# Patient Record
Sex: FEMALE | Race: WHITE | NOT HISPANIC OR LATINO
[De-identification: names, ages, dates, MRNs, and addresses within clinical notes are randomized per-mention and may not be internally consistent; named-entity substitution may affect disease eponyms.]

---

## 2018-05-22 ENCOUNTER — APPOINTMENT (OUTPATIENT)
Dept: FAMILY MEDICINE | Facility: CLINIC | Age: 53
End: 2018-05-22
Payer: COMMERCIAL

## 2018-05-22 ENCOUNTER — NON-APPOINTMENT (OUTPATIENT)
Age: 53
End: 2018-05-22

## 2018-05-22 VITALS
SYSTOLIC BLOOD PRESSURE: 124 MMHG | HEIGHT: 65.5 IN | OXYGEN SATURATION: 99 % | WEIGHT: 128 LBS | DIASTOLIC BLOOD PRESSURE: 82 MMHG | RESPIRATION RATE: 14 BRPM | BODY MASS INDEX: 21.07 KG/M2 | HEART RATE: 82 BPM

## 2018-05-22 VITALS — TEMPERATURE: 98.3 F

## 2018-05-22 DIAGNOSIS — R00.2 PALPITATIONS: ICD-10-CM

## 2018-05-22 DIAGNOSIS — Z87.891 PERSONAL HISTORY OF NICOTINE DEPENDENCE: ICD-10-CM

## 2018-05-22 DIAGNOSIS — Z00.00 ENCOUNTER FOR GENERAL ADULT MEDICAL EXAMINATION W/OUT ABNORMAL FINDINGS: ICD-10-CM

## 2018-05-22 DIAGNOSIS — Z78.9 OTHER SPECIFIED HEALTH STATUS: ICD-10-CM

## 2018-05-22 DIAGNOSIS — J30.9 ALLERGIC RHINITIS, UNSPECIFIED: ICD-10-CM

## 2018-05-22 DIAGNOSIS — Z87.448 PERSONAL HISTORY OF OTHER DISEASES OF URINARY SYSTEM: ICD-10-CM

## 2018-05-22 PROCEDURE — 81003 URINALYSIS AUTO W/O SCOPE: CPT | Mod: QW

## 2018-05-22 PROCEDURE — 99396 PREV VISIT EST AGE 40-64: CPT | Mod: 25

## 2018-05-22 PROCEDURE — 93000 ELECTROCARDIOGRAM COMPLETE: CPT

## 2018-05-22 NOTE — PHYSICAL EXAM
[No Acute Distress] : no acute distress [Well Nourished] : well nourished [Well Developed] : well developed [Well-Appearing] : well-appearing [Normal Sclera/Conjunctiva] : normal sclera/conjunctiva [PERRL] : pupils equal round and reactive to light [EOMI] : extraocular movements intact [Normal Outer Ear/Nose] : the outer ears and nose were normal in appearance [Normal Oropharynx] : the oropharynx was normal [No JVD] : no jugular venous distention [Supple] : supple [No Lymphadenopathy] : no lymphadenopathy [Thyroid Normal, No Nodules] : the thyroid was normal and there were no nodules present [No Respiratory Distress] : no respiratory distress  [Clear to Auscultation] : lungs were clear to auscultation bilaterally [Normal Rate] : normal rate  [Regular Rhythm] : with a regular rhythm [Normal S1, S2] : normal S1 and S2 [No Murmur] : no murmur heard [No Carotid Bruits] : no carotid bruits [No Abdominal Bruit] : a ~M bruit was not heard ~T in the abdomen [No Varicosities] : no varicosities [Pedal Pulses Present] : the pedal pulses are present [No Edema] : there was no peripheral edema [No Extremity Clubbing/Cyanosis] : no extremity clubbing/cyanosis [No Palpable Aorta] : no palpable aorta [Soft] : abdomen soft [Non Tender] : non-tender [Non-distended] : non-distended [No Masses] : no abdominal mass palpated [No HSM] : no HSM [Normal Bowel Sounds] : normal bowel sounds [Normal Posterior Cervical Nodes] : no posterior cervical lymphadenopathy [Normal Anterior Cervical Nodes] : no anterior cervical lymphadenopathy [No CVA Tenderness] : no CVA  tenderness [No Spinal Tenderness] : no spinal tenderness [No Joint Swelling] : no joint swelling [Grossly Normal Strength/Tone] : grossly normal strength/tone [No Rash] : no rash [Normal Gait] : normal gait [Coordination Grossly Intact] : coordination grossly intact [No Focal Deficits] : no focal deficits [Deep Tendon Reflexes (DTR)] : deep tendon reflexes were 2+ and symmetric [Speech Grossly Normal] : speech grossly normal [Memory Grossly Normal] : memory grossly normal [Normal Affect] : the affect was normal [Alert and Oriented x3] : oriented to person, place, and time [Normal Mood] : the mood was normal [Normal Insight/Judgement] : insight and judgment were intact

## 2018-05-22 NOTE — ASSESSMENT
[FreeTextEntry1] : UA/EKG reviewed with pt. Meds were reviewed. Diet/exercise reviewed. Pt needed rx alprazolam 0.5 mg  # 20 for fear o flying and dental work. Lab rx.

## 2018-05-22 NOTE — HEALTH RISK ASSESSMENT
[Very Good] : ~his/her~  mood as very good [No falls in past year] : Patient reported no falls in the past year [0] : 2) Feeling down, depressed, or hopeless: Not at all (0) [Patient reported mammogram was normal] : Patient reported mammogram was normal [Patient reported PAP Smear was normal] : Patient reported PAP Smear was normal [Patient reported bone density results were normal] : Patient reported bone density results were normal [Patient reported colonoscopy was normal] : Patient reported colonoscopy was normal [Reviewed and Current] :  reviewed and current [None] : None [With Significant Other] : lives with significant other [Employed] : employed [College] : College [] :  [# Of Children ___] : has [unfilled] children [Feels Safe at Home] : Feels safe at home [Fully functional (bathing, dressing, toileting, transferring, walking, feeding)] : Fully functional (bathing, dressing, toileting, transferring, walking, feeding) [Fully functional (using the telephone, shopping, preparing meals, housekeeping, doing laundry, using] : Fully functional and needs no help or supervision to perform IADLs (using the telephone, shopping, preparing meals, housekeeping, doing laundry, using transportation, managing medications and managing finances) [Reports changes in vision] : Reports changes in vision [Smoke Detector] : smoke detector [Guns at Home] : guns at home [Safety elements used in home] : safety elements used in home [Seat Belt] :  uses seat belt [Sunscreen] : uses sunscreen [Discussed at today's visit] : Advance Directives Discussed at today's visit [] : No [de-identified] : Gyn, Derm [[id number]] : 0 [Change in mental status noted] : No change in mental status noted [Language] : denies difficulty with language [Behavior] : denies difficulty with behavior [Learning/Retaining New Information] : denies difficulty learning/retaining new information [Handling Complex Tasks] : denies difficulty handling complex tasks [Reasoning] : denies difficulty with reasoning [Spatial Ability and Orientation] : denies difficulty with spatial ability and orientation [Reports changes in hearing] : Reports no changes in hearing [Reports changes in dental health] : Reports no changes in dental health [Carbon Monoxide Detector] : no carbon monoxide detector [MammogramDate] : Sep2017 [[facility] : [id number] [BoneDensityDate] : [id number] [ColonoscopyDate] : Oct2016

## 2018-05-23 LAB
BILIRUB UR QL STRIP: NORMAL
CYTOLOGY CVX/VAG DOC THIN PREP: NORMAL
GLUCOSE UR-MCNC: NORMAL
HCG UR QL: 0.2 EU/DL
HGB UR QL STRIP.AUTO: NORMAL
KETONES UR-MCNC: NORMAL
LEUKOCYTE ESTERASE UR QL STRIP: NORMAL
NITRITE UR QL STRIP: NORMAL
PH UR STRIP: 5.5
PROT UR STRIP-MCNC: NORMAL
SP GR UR STRIP: 1.02

## 2018-05-30 ENCOUNTER — RX RENEWAL (OUTPATIENT)
Age: 53
End: 2018-05-30

## 2018-06-04 ENCOUNTER — RX RENEWAL (OUTPATIENT)
Age: 53
End: 2018-06-04

## 2018-06-04 LAB
CHOLEST SERPL-MCNC: 276
CHOLEST/HDLC SERPL: 2.8
HBA1C MFR BLD HPLC: 4.9
HDLC SERPL-MCNC: 97
LDLC SERPL CALC-MCNC: 163
TRIGL SERPL-MCNC: 67

## 2018-09-11 ENCOUNTER — MEDICATION RENEWAL (OUTPATIENT)
Age: 53
End: 2018-09-11

## 2018-09-11 RX ORDER — RIZATRIPTAN BENZOATE 10 MG/1
10 TABLET, ORALLY DISINTEGRATING ORAL
Qty: 9 | Refills: 6 | Status: DISCONTINUED | COMMUNITY
Start: 2018-09-11 | End: 2018-09-11

## 2019-01-23 ENCOUNTER — RX RENEWAL (OUTPATIENT)
Age: 54
End: 2019-01-23

## 2019-02-15 ENCOUNTER — APPOINTMENT (OUTPATIENT)
Dept: FAMILY MEDICINE | Facility: CLINIC | Age: 54
End: 2019-02-15
Payer: COMMERCIAL

## 2019-02-15 VITALS
DIASTOLIC BLOOD PRESSURE: 84 MMHG | OXYGEN SATURATION: 97 % | BODY MASS INDEX: 21.4 KG/M2 | WEIGHT: 130 LBS | SYSTOLIC BLOOD PRESSURE: 126 MMHG | TEMPERATURE: 99.4 F | HEIGHT: 65.5 IN | RESPIRATION RATE: 14 BRPM | HEART RATE: 113 BPM

## 2019-02-15 LAB
CHOLEST SERPL-MCNC: 248
CHOLEST/HDLC SERPL: 2.7
HDLC SERPL-MCNC: 92
LDLC SERPL CALC-MCNC: 140
TRIGL SERPL-MCNC: 70

## 2019-02-15 PROCEDURE — 99213 OFFICE O/P EST LOW 20 MIN: CPT

## 2019-02-15 NOTE — REVIEW OF SYSTEMS
[Fever] : fever [Chills] : chills [Nasal Discharge] : nasal discharge [Postnasal Drip] : postnasal drip [Cough] : cough [Negative] : Heme/Lymph [FreeTextEntry4] : facial pressure

## 2019-02-15 NOTE — PHYSICAL EXAM
[No Acute Distress] : no acute distress [Well Nourished] : well nourished [Well Developed] : well developed [Well-Appearing] : well-appearing [Normal Outer Ear/Nose] : the outer ears and nose were normal in appearance [Normal TMs] : both tympanic membranes were normal [No JVD] : no jugular venous distention [Supple] : supple [No Lymphadenopathy] : no lymphadenopathy [No Respiratory Distress] : no respiratory distress  [Clear to Auscultation] : lungs were clear to auscultation bilaterally [Normal Rate] : normal rate  [Regular Rhythm] : with a regular rhythm [No Murmur] : no murmur heard [No Carotid Bruits] : no carotid bruits [Normal Posterior Cervical Nodes] : no posterior cervical lymphadenopathy [Normal Anterior Cervical Nodes] : no anterior cervical lymphadenopathy [Speech Grossly Normal] : speech grossly normal [Memory Grossly Normal] : memory grossly normal [Normal Affect] : the affect was normal [Alert and Oriented x3] : oriented to person, place, and time [Normal Mood] : the mood was normal [Normal Insight/Judgement] : insight and judgment were intact [de-identified] : facial pressure,  PND

## 2019-02-15 NOTE — HISTORY OF PRESENT ILLNESS
[FreeTextEntry8] : Pt c/o +earache +cough +losing voice +chills X 2 days  She has a lot of facial pressure, Has fever/chills.

## 2019-02-15 NOTE — ASSESSMENT
[FreeTextEntry1] : She will be using plain mucinex,  nasal spray and rx for cefdinir 300 mg 1 bid pc which she has no issues with all her allergies.

## 2019-02-17 ENCOUNTER — TRANSCRIPTION ENCOUNTER (OUTPATIENT)
Age: 54
End: 2019-02-17

## 2019-03-08 ENCOUNTER — MEDICATION RENEWAL (OUTPATIENT)
Age: 54
End: 2019-03-08

## 2019-03-08 DIAGNOSIS — Z87.42 PERSONAL HISTORY OF OTHER DISEASES OF THE FEMALE GENITAL TRACT: ICD-10-CM

## 2019-03-13 ENCOUNTER — APPOINTMENT (OUTPATIENT)
Dept: FAMILY MEDICINE | Facility: CLINIC | Age: 54
End: 2019-03-13
Payer: COMMERCIAL

## 2019-03-13 VITALS
RESPIRATION RATE: 14 BRPM | OXYGEN SATURATION: 99 % | SYSTOLIC BLOOD PRESSURE: 122 MMHG | HEART RATE: 80 BPM | HEIGHT: 65.5 IN | BODY MASS INDEX: 21.4 KG/M2 | DIASTOLIC BLOOD PRESSURE: 88 MMHG | WEIGHT: 130 LBS

## 2019-03-13 DIAGNOSIS — Z83.79 FAMILY HISTORY OF OTHER DISEASES OF THE DIGESTIVE SYSTEM: ICD-10-CM

## 2019-03-13 DIAGNOSIS — H92.02 OTALGIA, LEFT EAR: ICD-10-CM

## 2019-03-13 PROCEDURE — 96372 THER/PROPH/DIAG INJ SC/IM: CPT

## 2019-03-13 PROCEDURE — 99214 OFFICE O/P EST MOD 30 MIN: CPT | Mod: 25

## 2019-03-13 RX ORDER — IBUPROFEN 600 MG/1
600 TABLET, FILM COATED ORAL
Qty: 30 | Refills: 0 | Status: COMPLETED | COMMUNITY
Start: 2018-10-20 | End: 2019-03-13

## 2019-03-13 RX ORDER — CYANOCOBALAMIN 1000 UG/ML
1000 INJECTION INTRAMUSCULAR; SUBCUTANEOUS
Qty: 0 | Refills: 0 | Status: COMPLETED | OUTPATIENT
Start: 2019-03-13

## 2019-03-13 RX ORDER — FLUCONAZOLE 150 MG/1
150 TABLET ORAL
Qty: 2 | Refills: 2 | Status: COMPLETED | COMMUNITY
Start: 2019-03-08 | End: 2019-03-13

## 2019-03-13 RX ORDER — CEFDINIR 300 MG/1
300 CAPSULE ORAL
Qty: 20 | Refills: 0 | Status: COMPLETED | COMMUNITY
Start: 2019-02-15 | End: 2019-03-13

## 2019-03-13 RX ADMIN — CYANOCOBALAMIN 1 MCG/ML: 1000 INJECTION INTRAMUSCULAR; SUBCUTANEOUS at 00:00

## 2019-03-13 NOTE — PHYSICAL EXAM
[Normal Sclera/Conjunctiva] : normal sclera/conjunctiva [Normal Outer Ear/Nose] : the outer ears and nose were normal in appearance [Supple] : supple [No Lymphadenopathy] : no lymphadenopathy [No Respiratory Distress] : no respiratory distress  [Clear to Auscultation] : lungs were clear to auscultation bilaterally [No Accessory Muscle Use] : no accessory muscle use [Normal Rate] : normal rate  [Regular Rhythm] : with a regular rhythm [Normal S1, S2] : normal S1 and S2 [No Murmur] : no murmur heard [Normal Gait] : normal gait [Coordination Grossly Intact] : coordination grossly intact [No Focal Deficits] : no focal deficits [Normal Affect] : the affect was normal [Alert and Oriented x3] : oriented to person, place, and time [Normal Insight/Judgement] : insight and judgment were intact [de-identified] : bilateral nasal turbinates and posterior oropharynx erythematous, moderate clear post nasal drip, L tm serous effusion noted.

## 2019-03-13 NOTE — HISTORY OF PRESENT ILLNESS
[FreeTextEntry1] : Patient present for med check\par  [de-identified] : Patient presents today fro a follow up for anxiety. Pt states she uses xanax to help when she is flying. Manages her anxiety effectively. Pt also reports feeling fatigued and has lingering cold, she is requesting a b12 shot. Patient reports L ear still feels clogged. Denies fevers, chills, shortness of breath. \par \par

## 2019-03-13 NOTE — REVIEW OF SYSTEMS
[Fatigue] : fatigue [Negative] : Heme/Lymph [FreeTextEntry4] : ear clogged  [de-identified] : anxiety when flying

## 2019-03-13 NOTE — ASSESSMENT
[FreeTextEntry1] : L ear: start flonase, anithistamine daily. \par \par Anxiety with flying: - Patient advised of harmful side effects and use of dependence with long term use of benzodiazepines. Advised caution related to sedative effect and and respiratory depression. Advised not to take while driving or in combination with alcohol. Meditation, mindfulness, exercise, and psychotherapy encouraged. ISTOP checked.\par \par Fatigue - B12 administered today in office 1000mcg IM to L deltoid\par

## 2019-04-08 ENCOUNTER — TRANSCRIPTION ENCOUNTER (OUTPATIENT)
Age: 54
End: 2019-04-08

## 2019-06-20 ENCOUNTER — RX RENEWAL (OUTPATIENT)
Age: 54
End: 2019-06-20

## 2019-08-20 ENCOUNTER — APPOINTMENT (OUTPATIENT)
Dept: FAMILY MEDICINE | Facility: CLINIC | Age: 54
End: 2019-08-20
Payer: COMMERCIAL

## 2019-08-20 ENCOUNTER — NON-APPOINTMENT (OUTPATIENT)
Age: 54
End: 2019-08-20

## 2019-08-20 VITALS
RESPIRATION RATE: 14 BRPM | BODY MASS INDEX: 21.4 KG/M2 | HEART RATE: 85 BPM | OXYGEN SATURATION: 98 % | WEIGHT: 130 LBS | HEIGHT: 65.5 IN | SYSTOLIC BLOOD PRESSURE: 110 MMHG | DIASTOLIC BLOOD PRESSURE: 80 MMHG

## 2019-08-20 LAB
BILIRUB UR QL STRIP: NEGATIVE
GLUCOSE UR-MCNC: NEGATIVE
HCG UR QL: 0.2 EU/DL
HGB UR QL STRIP.AUTO: NEGATIVE
KETONES UR-MCNC: NEGATIVE
LEUKOCYTE ESTERASE UR QL STRIP: NEGATIVE
NITRITE UR QL STRIP: NEGATIVE
PH UR STRIP: 6.5
PROT UR STRIP-MCNC: NEGATIVE
SP GR UR STRIP: 1.01

## 2019-08-20 PROCEDURE — 93000 ELECTROCARDIOGRAM COMPLETE: CPT

## 2019-08-20 PROCEDURE — 81003 URINALYSIS AUTO W/O SCOPE: CPT | Mod: NC,QW

## 2019-08-20 PROCEDURE — 99396 PREV VISIT EST AGE 40-64: CPT | Mod: 25

## 2019-08-20 NOTE — HEALTH RISK ASSESSMENT
[Excellent] : ~his/her~  mood as  excellent [Yes] : Yes [2 - 3 times a week (3 pts)] : 2 - 3  times a week (3 points) [1 or 2 (0 pts)] : 1 or 2 (0 points) [Never (0 pts)] : Never (0 points) [No] : In the past 12 months have you used drugs other than those required for medical reasons? No [One fall no injury in past year] : Patient reported one fall in the past year without injury [0] : 2) Feeling down, depressed, or hopeless: Not at all (0) [None] : None [With Significant Other] : lives with significant other [Employed] : employed [College] : College [Significant Other] : lives with significant other [# Of Children ___] : has [unfilled] children [Feels Safe at Home] : Feels safe at home [Fully functional (bathing, dressing, toileting, transferring, walking, feeding)] : Fully functional (bathing, dressing, toileting, transferring, walking, feeding) [Fully functional (using the telephone, shopping, preparing meals, housekeeping, doing laundry, using] : Fully functional and needs no help or supervision to perform IADLs (using the telephone, shopping, preparing meals, housekeeping, doing laundry, using transportation, managing medications and managing finances) [Reports normal functional visual acuity (ie: able to read med bottle)] : Reports normal functional visual acuity [Smoke Detector] : smoke detector [Safety elements used in home] : safety elements used in home [Sunscreen] : uses sunscreen [Seat Belt] :  uses seat belt [Patient reported PAP Smear was normal] : Patient reported PAP Smear was normal [HIV test declined] : HIV test declined [Hepatitis C test declined] : Hepatitis C test declined [] : No [de-identified] : social  [de-identified] : gym 4 times per week.  [de-identified] : weight watchers [[id number]] : 0 [Change in mental status noted] : No change in mental status noted [Language] : denies difficulty with language [Behavior] : denies difficulty with behavior [Learning/Retaining New Information] : denies difficulty learning/retaining new information [Handling Complex Tasks] : denies difficulty handling complex tasks [Reasoning] : denies difficulty with reasoning [Spatial Ability and Orientation] : denies difficulty with spatial ability and orientation [Reports changes in hearing] : Reports no changes in hearing [Reports changes in vision] : Reports no changes in vision [Carbon Monoxide Detector] : no carbon monoxide detector [Reports changes in dental health] : Reports no changes in dental health [PapSmearDate] : 04/01/19 [de-identified] : no gas in home - everything electric  [FreeTextEntry2] :

## 2019-08-20 NOTE — HISTORY OF PRESENT ILLNESS
[FreeTextEntry1] : patient presents for physical\par  [de-identified] : Patient reports today for a physical. Patient reports feeling well today, no acute complaints. Denies chest pain, shortness of breath, palpitations, headaches, edema, dizziness, bowel or bladder changes.\par

## 2019-08-20 NOTE — ASSESSMENT
[FreeTextEntry1] : Check labs - lab slip given\par Vitals and exam stable \par UA negative\par EKG- NSR\par xanax renewed for travel -  Advised caution related to sedative effect and and respiratory depression. Advised not to take while driving or in combination with alcohol. Meditation, mindfulness, exercise, and psychotherapy encouraged. ISTOP checked.\par

## 2019-08-20 NOTE — PHYSICAL EXAM
[No Acute Distress] : no acute distress [Well Nourished] : well nourished [Well Developed] : well developed [Well-Appearing] : well-appearing [Normal Sclera/Conjunctiva] : normal sclera/conjunctiva [PERRL] : pupils equal round and reactive to light [EOMI] : extraocular movements intact [Normal Outer Ear/Nose] : the outer ears and nose were normal in appearance [Normal Oropharynx] : the oropharynx was normal [Normal Nasal Mucosa] : the nasal mucosa was normal [No JVD] : no jugular venous distention [No Lymphadenopathy] : no lymphadenopathy [Supple] : supple [Thyroid Normal, No Nodules] : the thyroid was normal and there were no nodules present [No Respiratory Distress] : no respiratory distress  [No Accessory Muscle Use] : no accessory muscle use [Clear to Auscultation] : lungs were clear to auscultation bilaterally [Normal Rate] : normal rate  [Regular Rhythm] : with a regular rhythm [Normal S1, S2] : normal S1 and S2 [No Murmur] : no murmur heard [No Carotid Bruits] : no carotid bruits [No Abdominal Bruit] : a ~M bruit was not heard ~T in the abdomen [No Varicosities] : no varicosities [Pedal Pulses Present] : the pedal pulses are present [No Edema] : there was no peripheral edema [No Palpable Aorta] : no palpable aorta [No Extremity Clubbing/Cyanosis] : no extremity clubbing/cyanosis [Soft] : abdomen soft [Non Tender] : non-tender [Non-distended] : non-distended [No Masses] : no abdominal mass palpated [Normal Bowel Sounds] : normal bowel sounds [No HSM] : no HSM [Normal Posterior Cervical Nodes] : no posterior cervical lymphadenopathy [Normal Anterior Cervical Nodes] : no anterior cervical lymphadenopathy [No CVA Tenderness] : no CVA  tenderness [No Spinal Tenderness] : no spinal tenderness [No Joint Swelling] : no joint swelling [Grossly Normal Strength/Tone] : grossly normal strength/tone [No Rash] : no rash [Coordination Grossly Intact] : coordination grossly intact [No Focal Deficits] : no focal deficits [Normal Gait] : normal gait [Normal Affect] : the affect was normal [Normal Insight/Judgement] : insight and judgment were intact [Normal TMs] : both tympanic membranes were normal [Speech Grossly Normal] : speech grossly normal [Memory Grossly Normal] : memory grossly normal [Alert and Oriented x3] : oriented to person, place, and time [Normal Mood] : the mood was normal

## 2019-08-20 NOTE — PAST MEDICAL HISTORY
[Perimenopausal] : perimenopausal [Menarche Age ____] : age at menarche was [unfilled] [Total Preg ___] : G[unfilled] [Full Term ___] : Full Term: [unfilled]

## 2019-10-14 ENCOUNTER — TRANSCRIPTION ENCOUNTER (OUTPATIENT)
Age: 54
End: 2019-10-14

## 2019-10-14 ENCOUNTER — APPOINTMENT (OUTPATIENT)
Dept: FAMILY MEDICINE | Facility: CLINIC | Age: 54
End: 2019-10-14
Payer: COMMERCIAL

## 2019-10-14 PROCEDURE — 90740 HEPB VACC 3 DOSE IMMUNSUP IM: CPT

## 2019-10-14 PROCEDURE — 90471 IMMUNIZATION ADMIN: CPT

## 2019-10-16 LAB
M TB IFN-G BLD-IMP: NEGATIVE
QUANTIFERON TB PLUS MITOGEN MINUS NIL: 3.07 IU/ML
QUANTIFERON TB PLUS NIL: 0.01 IU/ML
QUANTIFERON TB PLUS TB1 MINUS NIL: 0 IU/ML
QUANTIFERON TB PLUS TB2 MINUS NIL: 0 IU/ML

## 2019-12-12 ENCOUNTER — APPOINTMENT (OUTPATIENT)
Dept: FAMILY MEDICINE | Facility: CLINIC | Age: 54
End: 2019-12-12
Payer: COMMERCIAL

## 2019-12-12 VITALS
WEIGHT: 135 LBS | HEART RATE: 88 BPM | BODY MASS INDEX: 22.22 KG/M2 | SYSTOLIC BLOOD PRESSURE: 120 MMHG | DIASTOLIC BLOOD PRESSURE: 70 MMHG | HEIGHT: 65.5 IN | TEMPERATURE: 98.2 F | RESPIRATION RATE: 14 BRPM | OXYGEN SATURATION: 99 %

## 2019-12-12 DIAGNOSIS — R52 PAIN, UNSPECIFIED: ICD-10-CM

## 2019-12-12 DIAGNOSIS — Z87.09 PERSONAL HISTORY OF OTHER DISEASES OF THE RESPIRATORY SYSTEM: ICD-10-CM

## 2019-12-12 LAB
FLUAV SPEC QL CULT: NORMAL
FLUBV AG SPEC QL IA: NORMAL
S PYO AG SPEC QL IA: NEGATIVE

## 2019-12-12 PROCEDURE — 87880 STREP A ASSAY W/OPTIC: CPT | Mod: QW

## 2019-12-12 PROCEDURE — 99213 OFFICE O/P EST LOW 20 MIN: CPT | Mod: 25

## 2019-12-12 PROCEDURE — 87804 INFLUENZA ASSAY W/OPTIC: CPT | Mod: QW

## 2019-12-12 NOTE — PLAN
[FreeTextEntry1] : Acute pharyngitis, body aches and chills but no fevers and afebrile now/ cough, she had flu vaccine as well - most likely viral in nature \par -rapid flu neg\par -Rapid strep neg\par -Throat cx sent to ensure not strep c or false neg given sore throat is main symptom\par -Hot tea w/ honey, warm salt water gargles, increase fluids\par -rapid flu\par refused flu send out due to discomfort with swab\par mucinex bid prn\par \par Postnasal Drip \par -Flonase/azelastine - she has been using flonase, will add azelastine\par -Inc fluids\par -Humidifier use\par \par f/u 3 days if no relief pt agreed w/plan

## 2019-12-12 NOTE — REVIEW OF SYSTEMS
[Earache] : earache [Chills] : chills [Sore Throat] : sore throat [Postnasal Drip] : postnasal drip [Nasal Discharge] : nasal discharge [Cough] : cough [Fever] : no fever [Chest Pain] : no chest pain [Shortness Of Breath] : no shortness of breath [Wheezing] : no wheezing

## 2019-12-12 NOTE — HISTORY OF PRESENT ILLNESS
[___ Days ago] : [unfilled] days ago [FreeTextEntry8] : c/o 2 days of ear pain, sore throat feels like "glass in throat", "feels miserable"\par she said she has been taking motrin \par +sinus pressure, postnasal drip, nasal congestion w/yellow discharge , cough with yellow sputum \par denies sick contacts, recent travel except [facility] with kids \par she had flu vaccine\par +chills, no documented fevers \par +body aches \par denies chest pain or sob or difficulty swallowing\par able to eat and drink  without issue\par she is taking mucinex, claritin, motrin and vit c drinks \par feels tired \par worst symptom she said is her throat \par

## 2019-12-12 NOTE — PHYSICAL EXAM
[No Lymphadenopathy] : no lymphadenopathy [No Edema] : there was no peripheral edema [Normal] : no respiratory distress, lungs were clear to auscultation bilaterally and no accessory muscle use [Normal Posterior Cervical Nodes] : no posterior cervical lymphadenopathy [Normal Anterior Cervical Nodes] : no anterior cervical lymphadenopathy [Normal Affect] : the affect was normal [Normal Mood] : the mood was normal [No Focal Deficits] : no focal deficits [Normal Insight/Judgement] : insight and judgment were intact [de-identified] : left tonsil enlarged she said this is her normal, +erythema of throat but no exudate

## 2019-12-17 LAB — BACTERIA THROAT CULT: NORMAL

## 2020-05-12 ENCOUNTER — APPOINTMENT (OUTPATIENT)
Dept: FAMILY MEDICINE | Facility: CLINIC | Age: 55
End: 2020-05-12
Payer: COMMERCIAL

## 2020-05-12 DIAGNOSIS — U07.1 COVID-19: ICD-10-CM

## 2020-05-12 PROCEDURE — 99213 OFFICE O/P EST LOW 20 MIN: CPT | Mod: 95

## 2020-05-12 NOTE — HISTORY OF PRESENT ILLNESS
[Home] : at home, [unfilled] , at the time of the visit. 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  At present pt feels fine but she did have a " flu like illness in late December and would like to be tested for COVID.  Also needs to know her blood type.

## 2020-05-12 NOTE — ASSESSMENT
[FreeTextEntry1] : Possible exposure to COVID- we reviewed what to do- especially if her boss is positive for COVID.  I ordered the COVID19 AB test for  her.  She also needed an ABO blood typing done .  She will call me with any concerns/ changes

## 2020-05-12 NOTE — REVIEW OF SYSTEMS
[Fever] : no fever [Postnasal Drip] : no postnasal drip [Nasal Discharge] : no nasal discharge [Chills] : no chills [Anxiety] : anxiety [Cough] : no cough [Negative] : Psychiatric

## 2020-05-14 LAB
SARS-COV-2 IGG SERPL IA-ACNC: <0.1 INDEX
SARS-COV-2 IGG SERPL QL IA: NEGATIVE

## 2020-05-18 ENCOUNTER — RX RENEWAL (OUTPATIENT)
Age: 55
End: 2020-05-18

## 2020-06-18 PROBLEM — U07.1 COVID-19: Status: ACTIVE | Noted: 2020-05-12

## 2020-06-22 ENCOUNTER — RX RENEWAL (OUTPATIENT)
Age: 55
End: 2020-06-22

## 2020-08-24 ENCOUNTER — APPOINTMENT (OUTPATIENT)
Dept: FAMILY MEDICINE | Facility: CLINIC | Age: 55
End: 2020-08-24
Payer: COMMERCIAL

## 2020-08-24 ENCOUNTER — NON-APPOINTMENT (OUTPATIENT)
Age: 55
End: 2020-08-24

## 2020-08-24 VITALS
DIASTOLIC BLOOD PRESSURE: 70 MMHG | HEIGHT: 65.5 IN | OXYGEN SATURATION: 99 % | RESPIRATION RATE: 14 BRPM | SYSTOLIC BLOOD PRESSURE: 118 MMHG | BODY MASS INDEX: 22.39 KG/M2 | HEART RATE: 88 BPM | WEIGHT: 136 LBS | TEMPERATURE: 97.5 F

## 2020-08-24 DIAGNOSIS — M35.9 SYSTEMIC INVOLVEMENT OF CONNECTIVE TISSUE, UNSPECIFIED: ICD-10-CM

## 2020-08-24 LAB
BILIRUB UR QL STRIP: NORMAL
GLUCOSE UR-MCNC: NORMAL
HCG UR QL: 0.2 EU/DL
HGB UR QL STRIP.AUTO: NORMAL
KETONES UR-MCNC: NORMAL
LEUKOCYTE ESTERASE UR QL STRIP: NORMAL
NITRITE UR QL STRIP: NORMAL
PH UR STRIP: 5.5
PROT UR STRIP-MCNC: NORMAL
SP GR UR STRIP: 1.02

## 2020-08-24 PROCEDURE — 99396 PREV VISIT EST AGE 40-64: CPT | Mod: 25

## 2020-08-24 PROCEDURE — 36415 COLL VENOUS BLD VENIPUNCTURE: CPT

## 2020-08-24 PROCEDURE — 93000 ELECTROCARDIOGRAM COMPLETE: CPT

## 2020-08-24 PROCEDURE — 90471 IMMUNIZATION ADMIN: CPT

## 2020-08-24 PROCEDURE — 90746 HEPB VACCINE 3 DOSE ADULT IM: CPT

## 2020-08-24 PROCEDURE — 81003 URINALYSIS AUTO W/O SCOPE: CPT | Mod: QW

## 2020-08-24 RX ORDER — AZELASTINE HYDROCHLORIDE 137 UG/1
0.1 SPRAY, METERED NASAL TWICE DAILY
Qty: 1 | Refills: 0 | Status: DISCONTINUED | COMMUNITY
Start: 2019-12-12 | End: 2020-08-24

## 2020-08-24 NOTE — HEALTH RISK ASSESSMENT
[Very Good] : ~his/her~ current health as very good [Excellent] : ~his/her~  mood as  excellent [No falls in past year] : Patient reported no falls in the past year [HIV test declined] : HIV test declined [Hepatitis C test declined] : Hepatitis C test declined [None] : None [With Significant Other] : lives with significant other [Employed] : employed [College] : College [Significant Other] : lives with significant other [Feels Safe at Home] : Feels safe at home [Fully functional (bathing, dressing, toileting, transferring, walking, feeding)] : Fully functional (bathing, dressing, toileting, transferring, walking, feeding) [Fully functional (using the telephone, shopping, preparing meals, housekeeping, doing laundry, using] : Fully functional and needs no help or supervision to perform IADLs (using the telephone, shopping, preparing meals, housekeeping, doing laundry, using transportation, managing medications and managing finances) [Smoke Detector] : smoke detector [Carbon Monoxide Detector] : carbon monoxide detector [Safety elements used in home] : safety elements used in home [Seat Belt] :  uses seat belt [Sunscreen] : uses sunscreen [Patient reported PAP Smear was normal] : Patient reported PAP Smear was normal [de-identified] : 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   [de-identified] : active  [de-identified] : healthy  [Change in mental status noted] : No change in mental status noted [Language] : denies difficulty with language [Behavior] : denies difficulty with behavior [Learning/Retaining New Information] : denies difficulty learning/retaining new information [Handling Complex Tasks] : denies difficulty handling complex tasks [Reasoning] : denies difficulty with reasoning [Spatial Ability and Orientation] : denies difficulty with spatial ability and orientation [Reports changes in hearing] : Reports no changes in hearing [Reports changes in vision] : Reports no changes in vision [Reports changes in dental health] : Reports no changes in dental health [FreeTextEntry2] :  [PapSmearDate] : 06/20

## 2020-08-24 NOTE — ASSESSMENT
[FreeTextEntry1] : Check labs drawn in office today for above assessed conditions. Labs to be resulted at the [facility] core lab.\par Vitals and exam stable \par HM UTD\par UA and EKG stable\par Patient advised of harmful side effects and risk of dependence with long term use of benzodiazepines. Advised caution related to sedative effect and and respiratory depression. Advised not to take while driving or in combination with alcohol. Meditation, mindfulness, exercise, and psychotherapy encouraged. ISTOP checked. Reference #: [id number]. Narcotic contract signed, urine drug screen sent. \par Hep B admin to L deltoid, tolerated well.

## 2020-08-24 NOTE — PHYSICAL EXAM
[No Acute Distress] : no acute distress [Well-Appearing] : well-appearing [Well Nourished] : well nourished [Well Developed] : well developed [Normal Sclera/Conjunctiva] : normal sclera/conjunctiva [EOMI] : extraocular movements intact [PERRL] : pupils equal round and reactive to light [Normal Outer Ear/Nose] : the outer ears and nose were normal in appearance [Normal Oropharynx] : the oropharynx was normal [Normal TMs] : both tympanic membranes were normal [Normal Nasal Mucosa] : the nasal mucosa was normal [No Lymphadenopathy] : no lymphadenopathy [No JVD] : no jugular venous distention [Thyroid Normal, No Nodules] : the thyroid was normal and there were no nodules present [Supple] : supple [No Respiratory Distress] : no respiratory distress  [Clear to Auscultation] : lungs were clear to auscultation bilaterally [No Accessory Muscle Use] : no accessory muscle use [Normal Rate] : normal rate  [Normal S1, S2] : normal S1 and S2 [Regular Rhythm] : with a regular rhythm [No Murmur] : no murmur heard [No Carotid Bruits] : no carotid bruits [No Abdominal Bruit] : a ~M bruit was not heard ~T in the abdomen [No Edema] : there was no peripheral edema [No Varicosities] : no varicosities [Pedal Pulses Present] : the pedal pulses are present [No Extremity Clubbing/Cyanosis] : no extremity clubbing/cyanosis [No Palpable Aorta] : no palpable aorta [Non-distended] : non-distended [Soft] : abdomen soft [Non Tender] : non-tender [Normal Bowel Sounds] : normal bowel sounds [No Masses] : no abdominal mass palpated [No HSM] : no HSM [Normal Posterior Cervical Nodes] : no posterior cervical lymphadenopathy [No CVA Tenderness] : no CVA  tenderness [Normal Anterior Cervical Nodes] : no anterior cervical lymphadenopathy [No Joint Swelling] : no joint swelling [No Spinal Tenderness] : no spinal tenderness [No Rash] : no rash [Grossly Normal Strength/Tone] : grossly normal strength/tone [Coordination Grossly Intact] : coordination grossly intact [Normal Gait] : normal gait [Memory Grossly Normal] : memory grossly normal [No Focal Deficits] : no focal deficits [Speech Grossly Normal] : speech grossly normal [Alert and Oriented x3] : oriented to person, place, and time [Normal Affect] : the affect was normal [Normal Mood] : the mood was normal [Normal Insight/Judgement] : insight and judgment were intact

## 2020-08-24 NOTE — HISTORY OF PRESENT ILLNESS
[FreeTextEntry1] : Patient presents for physical\par  [de-identified] : Patient reports today for a physical. Patient reports feeling well today, no acute complaints. Denies chest pain, shortness of breath, palpitations, headaches, edema, dizziness, bowel or bladder changes. Patient is also requesting renewal of xanax for her anxiety. Deneis side effects or aberrant behaviors or suicidal thoughts/ plans. She has not taken xanax in over 2 weeks.

## 2020-08-25 LAB
25(OH)D3 SERPL-MCNC: 61.6 NG/ML
ALBUMIN SERPL ELPH-MCNC: 4.6 G/DL
ALP BLD-CCNC: 57 U/L
ALT SERPL-CCNC: 17 U/L
ANION GAP SERPL CALC-SCNC: 13 MMOL/L
AST SERPL-CCNC: 25 U/L
BASOPHILS # BLD AUTO: 0.06 K/UL
BASOPHILS NFR BLD AUTO: 1.2 %
BILIRUB SERPL-MCNC: 0.3 MG/DL
BUN SERPL-MCNC: 11 MG/DL
CALCIUM SERPL-MCNC: 9.6 MG/DL
CHLORIDE SERPL-SCNC: 99 MMOL/L
CHOLEST SERPL-MCNC: 259 MG/DL
CHOLEST/HDLC SERPL: 2.3 RATIO
CO2 SERPL-SCNC: 24 MMOL/L
CREAT SERPL-MCNC: 0.71 MG/DL
EOSINOPHIL # BLD AUTO: 0.11 K/UL
EOSINOPHIL NFR BLD AUTO: 2.1 %
GLUCOSE SERPL-MCNC: 87 MG/DL
HCT VFR BLD CALC: 44.7 %
HDLC SERPL-MCNC: 115 MG/DL
HGB BLD-MCNC: 14.4 G/DL
IMM GRANULOCYTES NFR BLD AUTO: 0.4 %
LDLC SERPL CALC-MCNC: 133 MG/DL
LYMPHOCYTES # BLD AUTO: 1.24 K/UL
LYMPHOCYTES NFR BLD AUTO: 23.9 %
MAN DIFF?: NORMAL
MCHC RBC-ENTMCNC: 31.3 PG
MCHC RBC-ENTMCNC: 32.2 GM/DL
MCV RBC AUTO: 97.2 FL
MONOCYTES # BLD AUTO: 0.33 K/UL
MONOCYTES NFR BLD AUTO: 6.4 %
NEUTROPHILS # BLD AUTO: 3.42 K/UL
NEUTROPHILS NFR BLD AUTO: 66 %
PLATELET # BLD AUTO: 254 K/UL
POTASSIUM SERPL-SCNC: 4.3 MMOL/L
PROT SERPL-MCNC: 7.1 G/DL
RBC # BLD: 4.6 M/UL
RBC # FLD: 12.2 %
SODIUM SERPL-SCNC: 136 MMOL/L
T4 SERPL-MCNC: 5.1 UG/DL
TRIGL SERPL-MCNC: 57 MG/DL
TSH SERPL-ACNC: 2.87 UIU/ML
WBC # FLD AUTO: 5.18 K/UL

## 2020-08-27 LAB
AMPHET UR-MCNC: NEGATIVE
BARBITURATES UR-MCNC: NEGATIVE
BENZODIAZ UR-MCNC: NEGATIVE
COCAINE METAB.OTHER UR-MCNC: NEGATIVE
CREATININE, URINE: 153.6 MG/DL
METHADONE UR-MCNC: NEGATIVE
METHAQUALONE UR-MCNC: NEGATIVE
OPIATES UR-MCNC: NEGATIVE
PCP UR-MCNC: NEGATIVE
PROPOXYPH UR QL: NEGATIVE
THC UR QL: NEGATIVE

## 2020-09-18 ENCOUNTER — LABORATORY RESULT (OUTPATIENT)
Age: 55
End: 2020-09-18

## 2020-09-18 ENCOUNTER — APPOINTMENT (OUTPATIENT)
Dept: FAMILY MEDICINE | Facility: CLINIC | Age: 55
End: 2020-09-18
Payer: COMMERCIAL

## 2020-09-18 VITALS
TEMPERATURE: 97.9 F | BODY MASS INDEX: 22.39 KG/M2 | SYSTOLIC BLOOD PRESSURE: 140 MMHG | OXYGEN SATURATION: 98 % | HEART RATE: 79 BPM | DIASTOLIC BLOOD PRESSURE: 80 MMHG | WEIGHT: 136 LBS | RESPIRATION RATE: 14 BRPM | HEIGHT: 65.5 IN
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
## 2020-09-18 DIAGNOSIS — L03.90 CELLULITIS, UNSPECIFIED: ICD-10-CM

## 2020-09-18 DIAGNOSIS — Z91.89 OTHER SPECIFIED PERSONAL RISK FACTORS, NOT ELSEWHERE CLASSIFIED: ICD-10-CM

## 2020-09-18 PROCEDURE — 36415 COLL VENOUS BLD VENIPUNCTURE: CPT

## 2020-09-18 PROCEDURE — 99213 OFFICE O/P EST LOW 20 MIN: CPT | Mod: 25

## 2020-09-18 NOTE — PHYSICAL EXAM
[No Lymphadenopathy] : no lymphadenopathy [Supple] : supple [Normal] : normal rate, regular rhythm, normal S1 and S2 and no murmur heard [No Edema] : there was no peripheral edema [No Focal Deficits] : no focal deficits [Normal Affect] : the affect was normal [Normal Mood] : the mood was normal [Normal Insight/Judgement] : insight and judgment were intact [de-identified] : erythematous area surrounding insertion site of bug bite, with scab, removed to ensure not a tick with patients permission with underneath scab having a small greenish area no discharge was ableto be expressed , another lesion in left groin with mild surrounding erythema and scab

## 2020-09-18 NOTE — HISTORY OF PRESENT ILLNESS
[FreeTextEntry8] : pt c/o insect bite on left side of the back, +irritated, -+itchy, +red, - pus X 2 days  \par she isnt sure what kind of bug it was\[place] \par she had surronding erythema that was worse yesterday\par \par denies fevers, chills, joint pain

## 2020-09-18 NOTE — PLAN
[FreeTextEntry1] : \par \par bug bite will treat as cellulitis due to surrounding erythema \par tick panel\par doxycyline 200mg x 1 dose for lymes prophylaxis in case of tick bite\par then doxycyline 100mg po bid x 7 days if clear by 6 days can stop after 6 days \par mupirocin tid x 10 days\par \par hold off on flu vaccine for now given current  infection\par \par repeat tick panel 6 weeks\par if fever or chills needs to be seen right away if we arent open advised to go to urgent care \par \par labs drawn in office and will be sent to [facility] core lab\par \par f/u 3 days if no relief, pt agreed w/plan\par \par

## 2020-09-18 NOTE — REVIEW OF SYSTEMS
[Itching] : Itching [Skin Rash] : skin rash [Fever] : no fever [Chills] : no chills [Chest Pain] : no chest pain [Shortness Of Breath] : no shortness of breath

## 2020-09-20 ENCOUNTER — TRANSCRIPTION ENCOUNTER (OUTPATIENT)
Age: 55
End: 2020-09-20

## 2020-09-20 LAB
ALBUMIN SERPL ELPH-MCNC: 4.7 G/DL
ALP BLD-CCNC: 53 U/L
ALT SERPL-CCNC: 15 U/L
ANION GAP SERPL CALC-SCNC: 12 MMOL/L
AST SERPL-CCNC: 22 U/L
B BURGDOR AB SER-IMP: NEGATIVE
B BURGDOR IGG+IGM SER QL: 0.24 INDEX
BASOPHILS # BLD AUTO: 0.06 K/UL
BASOPHILS NFR BLD AUTO: 0.9 %
BILIRUB SERPL-MCNC: 0.4 MG/DL
BUN SERPL-MCNC: 11 MG/DL
CALCIUM SERPL-MCNC: 10 MG/DL
CHLORIDE SERPL-SCNC: 100 MMOL/L
CO2 SERPL-SCNC: 28 MMOL/L
CREAT SERPL-MCNC: 0.69 MG/DL
EOSINOPHIL # BLD AUTO: 0.15 K/UL
EOSINOPHIL NFR BLD AUTO: 2.2 %
GLUCOSE SERPL-MCNC: 90 MG/DL
HCT VFR BLD CALC: 42.6 %
HGB BLD-MCNC: 13.7 G/DL
IMM GRANULOCYTES NFR BLD AUTO: 0.1 %
LYMPHOCYTES # BLD AUTO: 1.6 K/UL
LYMPHOCYTES NFR BLD AUTO: 23.4 %
MAN DIFF?: NORMAL
MCHC RBC-ENTMCNC: 30.9 PG
MCHC RBC-ENTMCNC: 32.2 GM/DL
MCV RBC AUTO: 95.9 FL
MONOCYTES # BLD AUTO: 0.47 K/UL
MONOCYTES NFR BLD AUTO: 6.9 %
NEUTROPHILS # BLD AUTO: 4.55 K/UL
NEUTROPHILS NFR BLD AUTO: 66.5 %
PLATELET # BLD AUTO: 302 K/UL
POTASSIUM SERPL-SCNC: 4.8 MMOL/L
PROT SERPL-MCNC: 6.8 G/DL
RBC # BLD: 4.44 M/UL
RBC # FLD: 11.9 %
SODIUM SERPL-SCNC: 140 MMOL/L
WBC # FLD AUTO: 6.84 K/UL

## 2020-09-21 ENCOUNTER — NON-APPOINTMENT (OUTPATIENT)
Age: 55
End: 2020-09-21

## 2020-09-23 LAB
A PHAGO GROEL BLD QL NAA+NON-PROBE: NEGATIVE
ANAPLASMA PHAGOCYTO IGM COMENT: NORMAL
ANAPLASMA PHAGOCYTO IGM COMMENT: NORMAL
ANAPLASMA PHAGOCYTOPHILIA IGG ANTIBODIES: NORMAL
ANAPLASMA PHAGOCYTOPHILIA IGM ANTIBODIES: NORMAL
B BURGDOR AB SER-IMP: NEGATIVE
B BURGDOR IGM PATRN SER IB-IMP: NEGATIVE
B BURGDOR18KD IGG SER QL IB: NORMAL
B BURGDOR23KD IGG SER QL IB: NORMAL
B BURGDOR23KD IGM SER QL IB: NORMAL
B BURGDOR28KD IGG SER QL IB: NORMAL
B BURGDOR30KD IGG SER QL IB: NORMAL
B BURGDOR31KD IGG SER QL IB: NORMAL
B BURGDOR39KD IGG SER QL IB: NORMAL
B BURGDOR39KD IGM SER QL IB: NORMAL
B BURGDOR41KD IGG SER QL IB: PRESENT
B BURGDOR41KD IGM SER QL IB: NORMAL
B BURGDOR45KD IGG SER QL IB: NORMAL
B BURGDOR58KD IGG SER QL IB: NORMAL
B BURGDOR66KD IGG SER QL IB: NORMAL
B BURGDOR93KD IGG SER QL IB: NORMAL
B DIV+MO-1 18S RRNA BLD QL NAA+NON-PROBE: NEGATIVE
B DUNCANI 18S RRNA BLD QL NAA+NON-PROBE: NEGATIVE
B MICROTI 18S RRNA BLD QL NAA+NON-PROBE: NEGATIVE
B MICROTI AB SER QL: NORMAL
BABESIA ANTIBODIES, IGG: NORMAL
BABESIA ANTIBODIES, IGM: NORMAL
E CANIS+EWIN GROEL BLD QL NAA+NON-PROBE: NEGATIVE
E CHAFF GROEL BLD QL NAA+NON-PROBE: NEGATIVE
E MURIS EAUCL GROEL BLD QL NAA+NON-PRB: NEGATIVE
EHRLICIA CHAFFEENIS IGG ANTIBODIES: NORMAL
EHRLICIA CHAFFEENIS IGG COMMENT: NORMAL
EHRLICIA CHAFFEENIS IGG INTERP: NORMAL
EHRLICIA CHAFFEENIS IGM ANTIBODIES: NORMAL
F. TULARENSIS AB, IGG: NEGATIVE
F. TULARENSIS AB, IGM: NEGATIVE
F. TULARENSIS INTERPRETATION: NORMAL
GALACTOSE-ALPHA-1,3-GALACTOSE IGE: 0.15 KU/L
R RICKETTSI IGG CSF-ACNC: NEGATIVE
R RICKETTSI IGM CSF-ACNC: 1.25 INDEX

## 2020-09-25 ENCOUNTER — TRANSCRIPTION ENCOUNTER (OUTPATIENT)
Age: 55
End: 2020-09-25

## 2020-10-05 ENCOUNTER — TRANSCRIPTION ENCOUNTER (OUTPATIENT)
Age: 55
End: 2020-10-05

## 2020-11-21 ENCOUNTER — APPOINTMENT (OUTPATIENT)
Dept: FAMILY MEDICINE | Facility: CLINIC | Age: 55
End: 2020-11-21

## 2020-12-21 PROBLEM — Z87.42 HISTORY OF VAGINITIS: Status: RESOLVED | Noted: 2019-03-08 | Resolved: 2020-12-21

## 2020-12-21 PROBLEM — Z87.09 HISTORY OF SORE THROAT: Status: RESOLVED | Noted: 2019-12-12 | Resolved: 2020-12-21

## 2021-01-13 ENCOUNTER — APPOINTMENT (OUTPATIENT)
Dept: FAMILY MEDICINE | Facility: CLINIC | Age: 56
End: 2021-01-13
Payer: COMMERCIAL

## 2021-01-13 DIAGNOSIS — J32.9 CHRONIC SINUSITIS, UNSPECIFIED: ICD-10-CM

## 2021-01-13 PROCEDURE — 99441: CPT

## 2021-03-05 ENCOUNTER — APPOINTMENT (OUTPATIENT)
Dept: FAMILY MEDICINE | Facility: CLINIC | Age: 56
End: 2021-03-05
Payer: COMMERCIAL

## 2021-03-05 DIAGNOSIS — R09.81 NASAL CONGESTION: ICD-10-CM

## 2021-03-05 DIAGNOSIS — J01.90 ACUTE SINUSITIS, UNSPECIFIED: ICD-10-CM

## 2021-03-05 PROCEDURE — 99442: CPT

## 2021-04-25 ENCOUNTER — RX RENEWAL (OUTPATIENT)
Age: 56
End: 2021-04-25

## 2021-05-15 ENCOUNTER — APPOINTMENT (OUTPATIENT)
Dept: FAMILY MEDICINE | Facility: CLINIC | Age: 56
End: 2021-05-15

## 2021-07-18 ENCOUNTER — RX RENEWAL (OUTPATIENT)
Age: 56
End: 2021-07-18

## 2021-08-25 ENCOUNTER — APPOINTMENT (OUTPATIENT)
Dept: FAMILY MEDICINE | Facility: CLINIC | Age: 56
End: 2021-08-25
Payer: COMMERCIAL

## 2021-08-25 VITALS
OXYGEN SATURATION: 98 % | HEART RATE: 90 BPM | SYSTOLIC BLOOD PRESSURE: 138 MMHG | TEMPERATURE: 98 F | WEIGHT: 130 LBS | HEIGHT: 65.5 IN | BODY MASS INDEX: 21.4 KG/M2 | DIASTOLIC BLOOD PRESSURE: 94 MMHG | RESPIRATION RATE: 15 BRPM

## 2021-08-25 DIAGNOSIS — R59.1 GENERALIZED ENLARGED LYMPH NODES: ICD-10-CM

## 2021-08-25 DIAGNOSIS — R22.1 LOCALIZED SWELLING, MASS AND LUMP, NECK: ICD-10-CM

## 2021-08-25 LAB — CYTOLOGY CVX/VAG DOC THIN PREP: NORMAL

## 2021-08-25 PROCEDURE — 36415 COLL VENOUS BLD VENIPUNCTURE: CPT

## 2021-08-25 PROCEDURE — 99396 PREV VISIT EST AGE 40-64: CPT | Mod: 25

## 2021-08-25 PROCEDURE — G0444 DEPRESSION SCREEN ANNUAL: CPT | Mod: 59

## 2021-08-25 RX ORDER — DOXYCYCLINE 100 MG/1
100 CAPSULE ORAL TWICE DAILY
Qty: 14 | Refills: 0 | Status: DISCONTINUED | COMMUNITY
Start: 2020-09-18 | End: 2021-08-25

## 2021-08-25 RX ORDER — DOXYCYCLINE 100 MG/1
100 TABLET, FILM COATED ORAL
Qty: 14 | Refills: 0 | Status: DISCONTINUED | COMMUNITY
Start: 2020-09-18 | End: 2021-08-25

## 2021-08-25 RX ORDER — DOXYCYCLINE HYCLATE 100 MG/1
100 TABLET ORAL
Qty: 20 | Refills: 0 | Status: DISCONTINUED | COMMUNITY
Start: 2021-03-05 | End: 2021-08-25

## 2021-08-25 RX ORDER — IPRATROPIUM BROMIDE 42 UG/1
0.06 SPRAY NASAL
Qty: 1 | Refills: 3 | Status: DISCONTINUED | COMMUNITY
Start: 2021-03-05 | End: 2021-08-25

## 2021-08-25 RX ORDER — MUPIROCIN 20 MG/G
2 OINTMENT TOPICAL 3 TIMES DAILY
Qty: 1 | Refills: 0 | Status: DISCONTINUED | COMMUNITY
Start: 2020-09-18 | End: 2021-08-25

## 2021-08-25 NOTE — PLAN
[FreeTextEntry1] : \par cpe\par -Labs\par labs drawn in office and will be sent to [facility] core lab\par -Offered HIV/ Hep C Screening agreed \par -Advised annual ophthalmology, dental and dermatology visits- seen \par -Annual depression screening - negative\par \par possible right posterior lymphadenopathy, thyroid feels enlarged\par us neck\par tsh\par cbc\par \par fear of flying\par refilled xanax prn\par Reference #: [id number]\par \par she will have mammo and colonoscopy report sent\par \par \par f/u for results pt agreed w/plan\par

## 2021-08-25 NOTE — REVIEW OF SYSTEMS
[Anxiety] : anxiety [Negative] : Heme/Lymph [Fever] : no fever [Chills] : no chills [Night Sweats] : no night sweats [Recent Change In Weight] : ~T no recent weight change [Chest Pain] : no chest pain [Palpitations] : no palpitations [Dysuria] : no dysuria [Hematuria] : no hematuria [Itching] : no itching [Mole Changes] : no mole changes [Skin Rash] : no skin rash [Headache] : no headache [Dizziness] : no dizziness [Fainting] : no fainting [Depression] : no depression

## 2021-08-25 NOTE — HISTORY OF PRESENT ILLNESS
[FreeTextEntry1] : Patient presents for a physical exam  [de-identified] : 56yo F presents for cpe\par she is feeling well but having more anxiety lately\par she said paxil works well but she is having breakthrough anxiety\par she said she needs a xanax renewal for fear of flying\par

## 2021-08-25 NOTE — HEALTH RISK ASSESSMENT
[0] : 2) Feeling down, depressed, or hopeless: Not at all (0) [PHQ-2 Negative - No further assessment needed] : PHQ-2 Negative - No further assessment needed [Patient reported PAP Smear was normal] : Patient reported PAP Smear was normal [Patient reported colonoscopy was normal] : Patient reported colonoscopy was normal [HIV Test offered] : HIV Test offered [Hepatitis C test offered] : Hepatitis C test offered [[id number]] : 0 [MammogramComments] : schedule for next week  [PapSmearDate] : 6/2021 [ColonoscopyDate] : 2016 [ColonoscopyComments] : due in october

## 2021-08-25 NOTE — PHYSICAL EXAM
[Supple] : supple [No Edema] : there was no peripheral edema [Declined Breast Exam] : declined breast exam  [Normal] : soft, non-tender, non-distended, no masses palpated, no HSM and normal bowel sounds [No CVA Tenderness] : no CVA  tenderness [Grossly Normal Strength/Tone] : grossly normal strength/tone [No Rash] : no rash [Coordination Grossly Intact] : coordination grossly intact [Normal Affect] : the affect was normal [Normal Mood] : the mood was normal [Normal Insight/Judgement] : insight and judgment were intact [de-identified] : thyroid feels enlarged, possible right posterior lymphadenopathy  [de-identified] : no calf tenderness b/l LE [de-identified] : CN 2-12 INTACT, NORMAL STRENGTH UPPER AND LOWER EXT B/L 5/5, NORMAL RAPID ALTERNATING MOVEMENTS AND FINGER TO NOSE

## 2021-08-26 ENCOUNTER — APPOINTMENT (OUTPATIENT)
Dept: ULTRASOUND IMAGING | Facility: CLINIC | Age: 56
End: 2021-08-26
Payer: COMMERCIAL

## 2021-08-26 ENCOUNTER — OUTPATIENT (OUTPATIENT)
Dept: OUTPATIENT SERVICES | Facility: HOSPITAL | Age: 56
LOS: 1 days | End: 2021-08-26
Payer: COMMERCIAL

## 2021-08-26 DIAGNOSIS — R59.1 GENERALIZED ENLARGED LYMPH NODES: ICD-10-CM

## 2021-08-26 DIAGNOSIS — Z00.00 ENCOUNTER FOR GENERAL ADULT MEDICAL EXAMINATION WITHOUT ABNORMAL FINDINGS: ICD-10-CM

## 2021-08-26 DIAGNOSIS — R22.1 LOCALIZED SWELLING, MASS AND LUMP, NECK: ICD-10-CM

## 2021-08-26 PROCEDURE — 76536 US EXAM OF HEAD AND NECK: CPT | Mod: 26

## 2021-08-26 PROCEDURE — 76536 US EXAM OF HEAD AND NECK: CPT

## 2021-08-27 DIAGNOSIS — R79.89 OTHER SPECIFIED ABNORMAL FINDINGS OF BLOOD CHEMISTRY: ICD-10-CM

## 2021-08-27 DIAGNOSIS — E87.5 HYPERKALEMIA: ICD-10-CM

## 2021-08-27 LAB
ALBUMIN SERPL ELPH-MCNC: 5 G/DL
ALP BLD-CCNC: 74 U/L
ALT SERPL-CCNC: 18 U/L
ANION GAP SERPL CALC-SCNC: 12 MMOL/L
AST SERPL-CCNC: 23 U/L
BASOPHILS # BLD AUTO: 0.06 K/UL
BASOPHILS NFR BLD AUTO: 0.9 %
BILIRUB SERPL-MCNC: 0.4 MG/DL
BUN SERPL-MCNC: 13 MG/DL
CALCIUM SERPL-MCNC: 10.1 MG/DL
CHLORIDE SERPL-SCNC: 101 MMOL/L
CHOLEST SERPL-MCNC: 294 MG/DL
CO2 SERPL-SCNC: 26 MMOL/L
CREAT SERPL-MCNC: 0.75 MG/DL
EOSINOPHIL # BLD AUTO: 0.12 K/UL
EOSINOPHIL NFR BLD AUTO: 1.9 %
ESTIMATED AVERAGE GLUCOSE: 97 MG/DL
GLUCOSE SERPL-MCNC: 87 MG/DL
HBA1C MFR BLD HPLC: 5 %
HCT VFR BLD CALC: 45.5 %
HCV AB SER QL: NONREACTIVE
HCV S/CO RATIO: 0.17 S/CO
HDLC SERPL-MCNC: 118 MG/DL
HGB BLD-MCNC: 14.5 G/DL
HIV1+2 AB SPEC QL IA.RAPID: NONREACTIVE
IMM GRANULOCYTES NFR BLD AUTO: 0.3 %
LDLC SERPL CALC-MCNC: 166 MG/DL
LYMPHOCYTES # BLD AUTO: 1.35 K/UL
LYMPHOCYTES NFR BLD AUTO: 21.2 %
MAN DIFF?: NORMAL
MCHC RBC-ENTMCNC: 31.7 PG
MCHC RBC-ENTMCNC: 31.9 GM/DL
MCV RBC AUTO: 99.6 FL
MONOCYTES # BLD AUTO: 0.45 K/UL
MONOCYTES NFR BLD AUTO: 7.1 %
NEUTROPHILS # BLD AUTO: 4.38 K/UL
NEUTROPHILS NFR BLD AUTO: 68.6 %
NONHDLC SERPL-MCNC: 177 MG/DL
PLATELET # BLD AUTO: 259 K/UL
POTASSIUM SERPL-SCNC: 5.4 MMOL/L
PROT SERPL-MCNC: 7 G/DL
RBC # BLD: 4.57 M/UL
RBC # FLD: 11.9 %
SODIUM SERPL-SCNC: 138 MMOL/L
TRIGL SERPL-MCNC: 52 MG/DL
TSH SERPL-ACNC: 2 UIU/ML
WBC # FLD AUTO: 6.38 K/UL

## 2021-08-31 ENCOUNTER — NON-APPOINTMENT (OUTPATIENT)
Age: 56
End: 2021-08-31

## 2021-10-08 ENCOUNTER — APPOINTMENT (OUTPATIENT)
Dept: ENDOCRINOLOGY | Facility: CLINIC | Age: 56
End: 2021-10-08
Payer: COMMERCIAL

## 2021-10-08 VITALS
WEIGHT: 130 LBS | HEART RATE: 87 BPM | DIASTOLIC BLOOD PRESSURE: 88 MMHG | SYSTOLIC BLOOD PRESSURE: 150 MMHG | HEIGHT: 65 IN | BODY MASS INDEX: 21.66 KG/M2 | OXYGEN SATURATION: 99 %

## 2021-10-08 PROCEDURE — 99213 OFFICE O/P EST LOW 20 MIN: CPT

## 2021-10-08 PROCEDURE — 99203 OFFICE O/P NEW LOW 30 MIN: CPT

## 2021-10-08 NOTE — REVIEW OF SYSTEMS
[Fatigue] : fatigue [Irregular Menses] : irregular menses [Joint Pain] : joint pain [Anxiety] : anxiety [Heat Intolerance] : heat intolerance [Recent Weight Gain (___ Lbs)] : no recent weight gain [Recent Weight Loss (___ Lbs)] : no recent weight loss [Dysphagia] : no dysphagia [Neck Pain] : no neck pain [Dysphonia] : no dysphonia [Chest Pain] : no chest pain [Palpitations] : no palpitations [Shortness Of Breath] : no shortness of breath [Constipation] : no constipation [Diarrhea] : no diarrhea [Tremors] : no tremors [Depression] : no depression [Cold Intolerance] : no cold intolerance [Swelling] : no swelling

## 2021-10-08 NOTE — ASSESSMENT
[FreeTextEntry1] : 55 year old thyroid nodule found on routine physical exam by PCP. Thyroid sonogram done August 2021 reveals left mid to upper pole heterogenous nodule. Discussed and reviewed images with Dr Pink. Ok to repeat sonogram in 4 months, would consider FNA at that time.

## 2021-10-08 NOTE — HISTORY OF PRESENT ILLNESS
[FreeTextEntry1] : Patient presents today for evaluation of thyroid nodule.\par PCP determined thyromegaly on annual physical and recommended thyroid sonogram, which revealed LUMP 1.6 cm nodule.\par Sees rheumatology for undifferentiated connective tissue, found to have + thyroid antibodies on labs done by rheumatology years ago. Never required medication for thyroid.\par \par Denies anterior neck pain, dysphagia, and voice changes.\par No exposure to radiation of the neck.\par \par Thyroid sonogram August 2021: 1.2 x 0.9 x 1.6 cm isoechoic nodule in the mid to upper pole without calcifications.\par Review of images with Dr Pink reveals heterogenous nodule with slightly irregular borders.\par \par PMH: undifferentiated connective tissue\par PSH: cholecystectomy\par FH: no family history of thyroid disease

## 2021-10-08 NOTE — PHYSICAL EXAM
[Alert] : alert [Well Nourished] : well nourished [No Acute Distress] : no acute distress [Well Developed] : well developed [Normal Sclera/Conjunctiva] : normal sclera/conjunctiva [EOMI] : extra ocular movement intact [No Proptosis] : no proptosis [No Respiratory Distress] : no respiratory distress [No Accessory Muscle Use] : no accessory muscle use [Clear to Auscultation] : lungs were clear to auscultation bilaterally [Normal S1, S2] : normal S1 and S2 [Normal Rate] : heart rate was normal [Regular Rhythm] : with a regular rhythm [No Edema] : no peripheral edema [Normal Anterior Cervical Nodes] : no anterior cervical lymphadenopathy [Normal Posterior Cervical Nodes] : no posterior cervical lymphadenopathy [Normal Reflexes] : deep tendon reflexes were 2+ and symmetric [No Tremors] : no tremors [Oriented x3] : oriented to person, place, and time [Normal Affect] : the affect was normal [Normal Mood] : the mood was normal [Acanthosis Nigricans] : no acanthosis nigricans [de-identified] : left thyroid nodule

## 2021-11-01 ENCOUNTER — APPOINTMENT (OUTPATIENT)
Dept: ENDOCRINOLOGY | Facility: CLINIC | Age: 56
End: 2021-11-01

## 2021-11-01 ENCOUNTER — RX RENEWAL (OUTPATIENT)
Age: 56
End: 2021-11-01

## 2021-11-22 ENCOUNTER — TRANSCRIPTION ENCOUNTER (OUTPATIENT)
Age: 56
End: 2021-11-22

## 2021-11-30 ENCOUNTER — APPOINTMENT (OUTPATIENT)
Dept: FAMILY MEDICINE | Facility: CLINIC | Age: 56
End: 2021-11-30

## 2021-12-10 ENCOUNTER — APPOINTMENT (OUTPATIENT)
Dept: FAMILY MEDICINE | Facility: CLINIC | Age: 56
End: 2021-12-10
Payer: COMMERCIAL

## 2021-12-10 DIAGNOSIS — J02.9 ACUTE PHARYNGITIS, UNSPECIFIED: ICD-10-CM

## 2021-12-10 DIAGNOSIS — F41.1 GENERALIZED ANXIETY DISORDER: ICD-10-CM

## 2021-12-10 DIAGNOSIS — R09.82 POSTNASAL DRIP: ICD-10-CM

## 2021-12-10 DIAGNOSIS — Z91.018 ALLERGY TO OTHER FOODS: ICD-10-CM

## 2021-12-10 PROCEDURE — 99441: CPT

## 2021-12-15 ENCOUNTER — APPOINTMENT (OUTPATIENT)
Dept: FAMILY MEDICINE | Facility: CLINIC | Age: 56
End: 2021-12-15
Payer: COMMERCIAL

## 2021-12-15 VITALS
OXYGEN SATURATION: 98 % | HEIGHT: 65 IN | HEART RATE: 101 BPM | BODY MASS INDEX: 22.99 KG/M2 | DIASTOLIC BLOOD PRESSURE: 90 MMHG | TEMPERATURE: 97.3 F | WEIGHT: 138 LBS | RESPIRATION RATE: 14 BRPM | SYSTOLIC BLOOD PRESSURE: 122 MMHG

## 2021-12-15 LAB
BILIRUB UR QL STRIP: NEGATIVE
GLUCOSE UR-MCNC: NEGATIVE
HCG UR QL: 0.2 EU/DL
HGB UR QL STRIP.AUTO: NEGATIVE
KETONES UR-MCNC: NEGATIVE
LEUKOCYTE ESTERASE UR QL STRIP: NORMAL
NITRITE UR QL STRIP: NEGATIVE
PH UR STRIP: 6.5
PROT UR STRIP-MCNC: 30
SP GR UR STRIP: 1.01

## 2021-12-15 PROCEDURE — 99212 OFFICE O/P EST SF 10 MIN: CPT | Mod: 25

## 2021-12-15 PROCEDURE — 81003 URINALYSIS AUTO W/O SCOPE: CPT | Mod: NC,QW

## 2021-12-16 NOTE — HISTORY OF PRESENT ILLNESS
[FreeTextEntry8] : patient c/o abdominal pressure, +lower back pain + frequent urination X 3 days. Denies fever, chills and hematuria.

## 2021-12-16 NOTE — PHYSICAL EXAM
[No Acute Distress] : no acute distress [Normal Sclera/Conjunctiva] : normal sclera/conjunctiva [Normal Outer Ear/Nose] : the outer ears and nose were normal in appearance [No JVD] : no jugular venous distention [No Respiratory Distress] : no respiratory distress  [No Accessory Muscle Use] : no accessory muscle use [Clear to Auscultation] : lungs were clear to auscultation bilaterally [Normal Rate] : normal rate  [Regular Rhythm] : with a regular rhythm [Normal S1, S2] : normal S1 and S2 [No Murmur] : no murmur heard [No Extremity Clubbing/Cyanosis] : no extremity clubbing/cyanosis [Soft] : abdomen soft [Non Tender] : non-tender [No HSM] : no HSM [Normal Bowel Sounds] : normal bowel sounds [No CVA Tenderness] : no CVA  tenderness [Normal Gait] : normal gait [Normal Affect] : the affect was normal [Alert and Oriented x3] : oriented to person, place, and time

## 2021-12-16 NOTE — END OF VISIT
[FreeTextEntry3] : 18 minutes was spent with patient. Extensive discussion regarding medical compliance with medications, healthy lifestyle and importance of behavioral health.\par  [Time Spent: ___ minutes] : I have spent [unfilled] minutes of time on the encounter.

## 2021-12-16 NOTE — PLAN
[FreeTextEntry1] : 56 yr old female \par Acute UTI: \par in office urine with presence of leukocytes \par will start patient on Macrobid as directed \par advised to stay well hydrated \par may take Tylenol and or Motrin for relief \par may use AZO as directed \par will send urine for cx \par \par RTO as routine for follow up.\par

## 2021-12-20 LAB — BACTERIA UR CULT: ABNORMAL

## 2022-01-04 ENCOUNTER — APPOINTMENT (OUTPATIENT)
Dept: FAMILY MEDICINE | Facility: CLINIC | Age: 57
End: 2022-01-04
Payer: COMMERCIAL

## 2022-01-04 VITALS
DIASTOLIC BLOOD PRESSURE: 90 MMHG | RESPIRATION RATE: 15 BRPM | WEIGHT: 135 LBS | BODY MASS INDEX: 22.49 KG/M2 | HEIGHT: 65 IN | SYSTOLIC BLOOD PRESSURE: 122 MMHG | HEART RATE: 97 BPM | TEMPERATURE: 97.6 F | OXYGEN SATURATION: 99 %

## 2022-01-04 DIAGNOSIS — N39.0 URINARY TRACT INFECTION, SITE NOT SPECIFIED: ICD-10-CM

## 2022-01-04 LAB
BILIRUB UR QL STRIP: NEGATIVE
GLUCOSE UR-MCNC: NEGATIVE
HCG UR QL: 0.2 EU/DL
HGB UR QL STRIP.AUTO: NEGATIVE
KETONES UR-MCNC: NEGATIVE
LEUKOCYTE ESTERASE UR QL STRIP: NORMAL
NITRITE UR QL STRIP: NEGATIVE
PH UR STRIP: 6
PROT UR STRIP-MCNC: NEGATIVE
SP GR UR STRIP: 1

## 2022-01-04 PROCEDURE — 81003 URINALYSIS AUTO W/O SCOPE: CPT | Mod: NC,QW

## 2022-01-04 PROCEDURE — 99214 OFFICE O/P EST MOD 30 MIN: CPT | Mod: 25

## 2022-01-04 NOTE — PLAN
[FreeTextEntry1] : Positive leukocytes\par given extensive AntibiOtic allergy will try fosfomycin 3g once\par if symptoms persist RTO\par will call with repeat culture \par RTO 2 weeks to give repeat culture

## 2022-01-04 NOTE — HISTORY OF PRESENT ILLNESS
[FreeTextEntry8] : Patient presents for ongoing UTI states she still feels urgency and cramping finished antibiotic 12/23/21\par \par Presenting in office for having continued urgency and cramping, she completed her nitrofurantoin on 12/23/2021

## 2022-01-06 LAB
APPEARANCE: CLEAR
BACTERIA UR CULT: NORMAL
BACTERIA: NEGATIVE
BILIRUBIN URINE: NEGATIVE
BLOOD URINE: NEGATIVE
COLOR: COLORLESS
GLUCOSE QUALITATIVE U: NEGATIVE
HYALINE CASTS: 2 /LPF
KETONES URINE: NEGATIVE
LEUKOCYTE ESTERASE URINE: ABNORMAL
MICROSCOPIC-UA: NORMAL
NITRITE URINE: NEGATIVE
PH URINE: 6
PROTEIN URINE: NEGATIVE
RED BLOOD CELLS URINE: 1 /HPF
SPECIFIC GRAVITY URINE: 1.01
SQUAMOUS EPITHELIAL CELLS: 2 /HPF
UROBILINOGEN URINE: NORMAL
WHITE BLOOD CELLS URINE: 5 /HPF

## 2022-01-18 ENCOUNTER — APPOINTMENT (OUTPATIENT)
Dept: FAMILY MEDICINE | Facility: CLINIC | Age: 57
End: 2022-01-18

## 2022-01-18 VITALS — TEMPERATURE: 97 F

## 2022-01-19 LAB
APPEARANCE: CLEAR
BACTERIA: NEGATIVE
BILIRUBIN URINE: NEGATIVE
BLOOD URINE: NEGATIVE
COLOR: NORMAL
GLUCOSE QUALITATIVE U: NEGATIVE
HYALINE CASTS: 1 /LPF
KETONES URINE: NEGATIVE
LEUKOCYTE ESTERASE URINE: NEGATIVE
MICROSCOPIC-UA: NORMAL
NITRITE URINE: NEGATIVE
PH URINE: 6.5
PROTEIN URINE: NEGATIVE
RED BLOOD CELLS URINE: 9 /HPF
SPECIFIC GRAVITY URINE: 1.01
SQUAMOUS EPITHELIAL CELLS: 2 /HPF
UROBILINOGEN URINE: NORMAL
WHITE BLOOD CELLS URINE: 2 /HPF

## 2022-01-20 ENCOUNTER — TRANSCRIPTION ENCOUNTER (OUTPATIENT)
Age: 57
End: 2022-01-20

## 2022-01-20 ENCOUNTER — NON-APPOINTMENT (OUTPATIENT)
Age: 57
End: 2022-01-20

## 2022-01-20 LAB — BACTERIA UR CULT: NORMAL

## 2022-02-04 ENCOUNTER — APPOINTMENT (OUTPATIENT)
Dept: FAMILY MEDICINE | Facility: CLINIC | Age: 57
End: 2022-02-04
Payer: COMMERCIAL

## 2022-02-04 PROCEDURE — ZZZZZ: CPT

## 2022-02-05 LAB
APPEARANCE: CLEAR
BILIRUBIN URINE: NEGATIVE
BLOOD URINE: NEGATIVE
COLOR: COLORLESS
GLUCOSE QUALITATIVE U: NEGATIVE
KETONES URINE: NEGATIVE
LEUKOCYTE ESTERASE URINE: NEGATIVE
NITRITE URINE: NEGATIVE
PH URINE: 6.5
PROTEIN URINE: NEGATIVE
SPECIFIC GRAVITY URINE: 1
UROBILINOGEN URINE: NORMAL

## 2022-02-07 ENCOUNTER — APPOINTMENT (OUTPATIENT)
Dept: ENDOCRINOLOGY | Facility: CLINIC | Age: 57
End: 2022-02-07
Payer: COMMERCIAL

## 2022-02-07 VITALS
SYSTOLIC BLOOD PRESSURE: 144 MMHG | DIASTOLIC BLOOD PRESSURE: 86 MMHG | HEART RATE: 91 BPM | BODY MASS INDEX: 22.49 KG/M2 | WEIGHT: 135 LBS | HEIGHT: 65 IN | OXYGEN SATURATION: 99 %

## 2022-02-07 PROCEDURE — 99215 OFFICE O/P EST HI 40 MIN: CPT

## 2022-02-17 ENCOUNTER — APPOINTMENT (OUTPATIENT)
Dept: ENDOCRINOLOGY | Facility: CLINIC | Age: 57
End: 2022-02-17
Payer: COMMERCIAL

## 2022-02-17 PROCEDURE — 76536 US EXAM OF HEAD AND NECK: CPT

## 2022-02-19 NOTE — PROCEDURE
[[employer] e 2008 model, 10-12 MHz frequencies] : multiple real time longitudinal and transverse images were obtained using a high resolution ultrasound with a linear transducer, [employer] e 2008 model, 10-12 MHz frequencies. All measurements will be reported as longitudinal x missael-posterior x transverse. [Report dated ___] : Report dated [unfilled] [Multinodular Goiter] : multinodular goiter [Heterogeneous] : heterogenous nodule [Irregular] : irregular [Indistinct] : indistinct [Microcalcifications] : microcalcifications [Left Thyroid] : left [Mid] : mid pole there is a  [Solid] : solid [Hypoechoic] : hypoechoic nodule [Round] : round in shape [Smooth] : smooth [Regular] : regular [Peripheral vascularity] : peripheral vascularity [FreeTextEntry1] : 4.4x1.4x1.3 [FreeTextEntry5] : 4.3x1.3x1.0 [FreeTextEntry2] : 0.2 [FreeTextEntry3] : 0.3x0.3x0.4

## 2022-02-19 NOTE — IMPRESSION
[FreeTextEntry1] : MNG- subcm left thyroid nodule and 1.5cm left heterogenous nodule [FreeTextEntry2] : biopsy of dominant left thyroid nodule and continued surveillance

## 2022-03-10 ENCOUNTER — LABORATORY RESULT (OUTPATIENT)
Age: 57
End: 2022-03-10

## 2022-03-10 ENCOUNTER — NON-APPOINTMENT (OUTPATIENT)
Age: 57
End: 2022-03-10

## 2022-03-10 ENCOUNTER — APPOINTMENT (OUTPATIENT)
Dept: FAMILY MEDICINE | Facility: CLINIC | Age: 57
End: 2022-03-10
Payer: COMMERCIAL

## 2022-03-10 VITALS — SYSTOLIC BLOOD PRESSURE: 168 MMHG | DIASTOLIC BLOOD PRESSURE: 100 MMHG

## 2022-03-10 VITALS
WEIGHT: 134 LBS | RESPIRATION RATE: 15 BRPM | OXYGEN SATURATION: 98 % | DIASTOLIC BLOOD PRESSURE: 98 MMHG | HEIGHT: 65 IN | SYSTOLIC BLOOD PRESSURE: 150 MMHG | HEART RATE: 75 BPM | BODY MASS INDEX: 22.33 KG/M2

## 2022-03-10 VITALS — SYSTOLIC BLOOD PRESSURE: 164 MMHG | DIASTOLIC BLOOD PRESSURE: 108 MMHG

## 2022-03-10 VITALS — TEMPERATURE: 98 F

## 2022-03-10 DIAGNOSIS — F43.21 ADJUSTMENT DISORDER WITH DEPRESSED MOOD: ICD-10-CM

## 2022-03-10 LAB
BILIRUB UR QL STRIP: NEGATIVE
CYTOLOGY CVX/VAG DOC THIN PREP: NORMAL
GLUCOSE UR-MCNC: NEGATIVE
HCG UR QL: 0.2 EU/DL
HGB UR QL STRIP.AUTO: NEGATIVE
KETONES UR-MCNC: NEGATIVE
LEUKOCYTE ESTERASE UR QL STRIP: NORMAL
NITRITE UR QL STRIP: NEGATIVE
PH UR STRIP: 6
PROT UR STRIP-MCNC: NEGATIVE
SP GR UR STRIP: 1.02

## 2022-03-10 PROCEDURE — 81003 URINALYSIS AUTO W/O SCOPE: CPT | Mod: NC,QW

## 2022-03-10 PROCEDURE — 99214 OFFICE O/P EST MOD 30 MIN: CPT | Mod: 25

## 2022-03-10 RX ORDER — FOSFOMYCIN TROMETHAMINE 3 G/1
3 POWDER ORAL DAILY
Qty: 1 | Refills: 0 | Status: DISCONTINUED | COMMUNITY
Start: 2022-01-04 | End: 2022-03-10

## 2022-03-10 NOTE — HISTORY OF PRESENT ILLNESS
[FreeTextEntry1] : patient presents for medication refill \par pt c/o lower back pain and cramping, possible UTI x 1 day  [de-identified] : 57yo F presents for possible uti and anxiety follow up\par \par she is sleeping better\par she has less anxiety\par she is seeing her therapist regularly \par she is facing everything since her brother passed away suddenly in front of her\par Denies suicidal or homicidal ideations\par she has a good support system\par \par c/o low back pain\par no burning or urgency or frequency or hematuria \par she drinks a lot of water\par denies fevers or chills \par +pelvic cramping , she thought her period was coming\par LMP 8 mos ago\par \par she said she is nervous in the Dr offices and her bp goes up\par she saw a cardiologist recently as well\par she has been eating a lot of salt recently\par \par

## 2022-03-10 NOTE — REVIEW OF SYSTEMS
[Anxiety] : anxiety [Fever] : no fever [Chills] : no chills [Dysuria] : no dysuria [Hematuria] : no hematuria [Frequency] : no frequency [Depression] : no depression

## 2022-03-10 NOTE — PLAN
[FreeTextEntry1] : \par elevated blood pressure due to salt intake/anxiety? hx white coat htn as per patient , handling grief of loss of brother well \par -Avoid salt\par -seen by cardiologist already \par -Continue to monitor BP at home and advised to bring readings to office next visit \par will call me today with BP reading , her father knows how to take a manual bp, and she will also take xanax tonight and take it again and see if her bp improves , if still high will start antihypertensive medications \par increase paroxetine to 50mg po daily\par xanax prn for now but advised to avoid taking it\par istopped [id number]\par continue therapy \par \par \par uti \par urine dip +leuks\par start macrobid 100mg po bid\par UA sent\par urine cx pending \par \par f/u 2-3 weeks for bp check pt agreed w/plan and understood \par \par

## 2022-03-10 NOTE — PHYSICAL EXAM
[No Lymphadenopathy] : no lymphadenopathy [Supple] : supple [No Edema] : there was no peripheral edema [No CVA Tenderness] : no CVA  tenderness [Normal] : soft, non-tender, non-distended, no masses palpated, no HSM and normal bowel sounds [No Rash] : no rash [No Focal Deficits] : no focal deficits [Normal Affect] : the affect was normal [Normal Mood] : the mood was normal [Normal Insight/Judgement] : insight and judgment were intact [de-identified] : no calf tenderness b/l LE [de-identified] : seems anxious

## 2022-03-11 ENCOUNTER — APPOINTMENT (OUTPATIENT)
Dept: ENDOCRINOLOGY | Facility: CLINIC | Age: 57
End: 2022-03-11
Payer: COMMERCIAL

## 2022-03-11 PROCEDURE — 10005 FNA BX W/US GDN 1ST LES: CPT

## 2022-03-12 LAB
APPEARANCE: CLEAR
BACTERIA UR CULT: NORMAL
BACTERIA: NEGATIVE
BILIRUBIN URINE: NEGATIVE
BLOOD URINE: NEGATIVE
COLOR: YELLOW
GLUCOSE QUALITATIVE U: NEGATIVE
HYALINE CASTS: 4 /LPF
KETONES URINE: NEGATIVE
LEUKOCYTE ESTERASE URINE: ABNORMAL
MICROSCOPIC-UA: NORMAL
NITRITE URINE: NEGATIVE
PH URINE: 6.5
PROTEIN URINE: NORMAL
RED BLOOD CELLS URINE: 1 /HPF
SPECIFIC GRAVITY URINE: 1.02
SQUAMOUS EPITHELIAL CELLS: 8 /HPF
UROBILINOGEN URINE: NORMAL
WHITE BLOOD CELLS URINE: 10 /HPF

## 2022-03-14 ENCOUNTER — TRANSCRIPTION ENCOUNTER (OUTPATIENT)
Age: 57
End: 2022-03-14

## 2022-03-14 ENCOUNTER — APPOINTMENT (OUTPATIENT)
Dept: ENDOCRINOLOGY | Facility: CLINIC | Age: 57
End: 2022-03-14
Payer: COMMERCIAL

## 2022-03-14 VITALS
HEIGHT: 65 IN | HEART RATE: 93 BPM | WEIGHT: 136 LBS | DIASTOLIC BLOOD PRESSURE: 82 MMHG | BODY MASS INDEX: 22.66 KG/M2 | OXYGEN SATURATION: 99 % | SYSTOLIC BLOOD PRESSURE: 150 MMHG

## 2022-03-14 PROCEDURE — 99213 OFFICE O/P EST LOW 20 MIN: CPT

## 2022-03-14 NOTE — PHYSICAL EXAM
[Alert] : alert [Well Nourished] : well nourished [No Acute Distress] : no acute distress [Well Developed] : well developed [Normal Sclera/Conjunctiva] : normal sclera/conjunctiva [No Proptosis] : no proptosis [No LAD] : no lymphadenopathy [Thyroid Not Enlarged] : the thyroid was not enlarged [No Thyroid Nodules] : no palpable thyroid nodules [No Respiratory Distress] : no respiratory distress [No Accessory Muscle Use] : no accessory muscle use [Normal Rate and Effort] : normal respiratory rate and effort [Clear to Auscultation] : lungs were clear to auscultation bilaterally [Normal S1, S2] : normal S1 and S2 [Normal Rate] : heart rate was normal [Regular Rhythm] : with a regular rhythm [Normal Gait] : normal gait [No Rash] : no rash [Acanthosis Nigricans] : no acanthosis nigricans [No Tremors] : no tremors [Oriented x3] : oriented to person, place, and time [Normal Affect] : the affect was normal [Normal Insight/Judgement] : insight and judgment were intact [Normal Mood] : the mood was normal

## 2022-03-14 NOTE — HISTORY OF PRESENT ILLNESS
[FreeTextEntry1] : Interval History: Presents in office today due to hurts swallowing more with food after US guided FNA left thyroid nodule done on Friday by Dr. Pink.\par C/o post nasal drip. Denies fever/chills/hoarseness \par \par thyroid hx: found on physical exam\par thyroid ultrasound: august 2021\par FH of thyroid issue: none\par FH of thyroid cancer: none\par FH of diabetes: none\par external beam radiation: none\par \par has personal hx of undifferentiated connective tissue disorder\par has Hashimoto's \par \par \par meds for thyroid: none\par menopause: 50s\par

## 2022-03-14 NOTE — ASSESSMENT
[FreeTextEntry1] : 57 y/o female w/ single thyroid nodule, Hashimotos, undifferentiated connective tissue disorder. \par \par Left thyroid nodule-s/p US guided FNA thyroid biopsy c/o swallowing discomfort mostly with food. Sonogram revealed unchanged thyroid nodule. Most likely throat irritated from biopsy and patient also has post nasal drip. By the end of the visit reports discomfort resolved. If throat continues to hurt than call the office back. No heavy lifting more than 10 pounds and no vitamin e or omga-3 for a few days. \par \par Hashimoto's- currently euthyroid. will need to discuss implications of needing LT4 in the future. \par \par RTO in 6 months with Gisela \par \par Dr. Bull seen patient and performed sonogram. \par

## 2022-03-14 NOTE — REVIEW OF SYSTEMS
[Dysphagia] : dysphagia [Anxiety] : anxiety [Fatigue] : no fatigue [Decreased Appetite] : appetite not decreased [Recent Weight Gain (___ Lbs)] : no recent weight gain [Recent Weight Loss (___ Lbs)] : no recent weight loss [Visual Field Defect] : no visual field defect [Blurred Vision] : no blurred vision [Neck Pain] : no neck pain [Dysphonia] : no dysphonia [Chest Pain] : no chest pain [Palpitations] : no palpitations [Dry Skin] : no dry skin [Hair Loss] : no hair loss [Headaches] : no headaches [Tremors] : no tremors [Depression] : no depression [FreeTextEntry2] : weight stable

## 2022-03-21 ENCOUNTER — APPOINTMENT (OUTPATIENT)
Dept: FAMILY MEDICINE | Facility: CLINIC | Age: 57
End: 2022-03-21

## 2022-03-24 ENCOUNTER — APPOINTMENT (OUTPATIENT)
Dept: FAMILY MEDICINE | Facility: CLINIC | Age: 57
End: 2022-03-24
Payer: COMMERCIAL

## 2022-03-24 ENCOUNTER — LABORATORY RESULT (OUTPATIENT)
Age: 57
End: 2022-03-24

## 2022-03-24 VITALS
TEMPERATURE: 98.3 F | OXYGEN SATURATION: 98 % | RESPIRATION RATE: 15 BRPM | BODY MASS INDEX: 22.66 KG/M2 | WEIGHT: 136 LBS | HEART RATE: 98 BPM | HEIGHT: 65 IN | DIASTOLIC BLOOD PRESSURE: 90 MMHG | SYSTOLIC BLOOD PRESSURE: 146 MMHG

## 2022-03-24 DIAGNOSIS — R82.90 UNSPECIFIED ABNORMAL FINDINGS IN URINE: ICD-10-CM

## 2022-03-24 DIAGNOSIS — R03.0 ELEVATED BLOOD-PRESSURE READING, W/OUT DIAGNOSIS OF HYPERTENSION: ICD-10-CM

## 2022-03-24 DIAGNOSIS — I10 ESSENTIAL (PRIMARY) HYPERTENSION: ICD-10-CM

## 2022-03-24 LAB
BILIRUB UR QL STRIP: NEGATIVE
GLUCOSE UR-MCNC: NEGATIVE
HCG UR QL: 0.2 EU/DL
HGB UR QL STRIP.AUTO: NEGATIVE
KETONES UR-MCNC: NEGATIVE
LEUKOCYTE ESTERASE UR QL STRIP: NORMAL
NITRITE UR QL STRIP: NEGATIVE
PH UR STRIP: 6.5
PROT UR STRIP-MCNC: NEGATIVE
SP GR UR STRIP: 1.02

## 2022-03-24 PROCEDURE — 99214 OFFICE O/P EST MOD 30 MIN: CPT | Mod: 25

## 2022-03-24 PROCEDURE — 36415 COLL VENOUS BLD VENIPUNCTURE: CPT

## 2022-03-24 PROCEDURE — 81003 URINALYSIS AUTO W/O SCOPE: CPT | Mod: NC,QW

## 2022-03-24 NOTE — REVIEW OF SYSTEMS
[Headache] : headache [Vision Problems] : no vision problems [Chest Pain] : no chest pain [Palpitations] : no palpitations [Shortness Of Breath] : no shortness of breath

## 2022-03-24 NOTE — PLAN
[FreeTextEntry1] : \par elevated blood pressure at home and in office\par seen by cardio\par avoid salt\par start losartan 25mg po daily \par recheck 2-3 weeks\par if dizziness advised to check bp and if low <100/60 to go to ED \par \par thyroid biopsy came back atypia of undetermined significance , being sent out for genetic testing\par \par uti sx\par start nitrofurantoin 100mg po bid x 7 days\par urine dip no more protein but small leuks\par ua/ urine cx sent \par \par anxiety controlled\par continue paroxetine 50mg po daily\par continue xanax0.5mg  prn but very sparingly \par \par f/u 2-3 weeks pt agreed w/plan

## 2022-03-24 NOTE — HISTORY OF PRESENT ILLNESS
[FreeTextEntry1] : Patient presents for b/p check and a urine re-check. Took her b/p using the monitor she brought from home , 138/84 [de-identified] : 55yo F presents for elevated blood pressure and urinary frequency and urgency \par \par bp at home has been 140/66 and 140-149/\par \par manual 146/98 \par machine 138/84 \par Denies chest pain, palpitations, shortness of breath, vision changes or LE edema\par +headaches but says it was sinus related\par \par anxiety is not horrible \par \par thyroid biopsy came back atypia of undetermined significance  so she had genetic testing performed \par +frequency and back pain \par denies  burning with urination,or blood in urine \par -fevers,  chills,  ,pelvic pain\par has a  hx of utis in the past\par "+low back pain\par "feels like menstrual cramps

## 2022-03-24 NOTE — PHYSICAL EXAM
[No Lymphadenopathy] : no lymphadenopathy [Supple] : supple [No Edema] : there was no peripheral edema [No Focal Deficits] : no focal deficits [Normal Affect] : the affect was normal [Normal Mood] : the mood was normal [Normal Insight/Judgement] : insight and judgment were intact [No CVA Tenderness] : no CVA  tenderness [Normal] : soft, non-tender, non-distended, no masses palpated, no HSM and normal bowel sounds [No Rash] : no rash [de-identified] : no calf tenderness b/l LE [de-identified] : mild pelvic pressure on palpation

## 2022-03-29 ENCOUNTER — TRANSCRIPTION ENCOUNTER (OUTPATIENT)
Age: 57
End: 2022-03-29

## 2022-03-29 LAB
ALBUMIN SERPL ELPH-MCNC: 4.8 G/DL
ALP BLD-CCNC: 78 U/L
ALT SERPL-CCNC: 14 U/L
ANION GAP SERPL CALC-SCNC: 10 MMOL/L
APPEARANCE: CLEAR
APPEARANCE: CLEAR
AST SERPL-CCNC: 20 U/L
BACTERIA UR CULT: NORMAL
BACTERIA: ABNORMAL
BILIRUB SERPL-MCNC: 0.4 MG/DL
BILIRUBIN URINE: NEGATIVE
BILIRUBIN URINE: NEGATIVE
BLOOD URINE: NEGATIVE
BLOOD URINE: NEGATIVE
BUN SERPL-MCNC: 11 MG/DL
CALCIUM SERPL-MCNC: 9.9 MG/DL
CHLORIDE SERPL-SCNC: 102 MMOL/L
CO2 SERPL-SCNC: 28 MMOL/L
COLOR: YELLOW
COLOR: YELLOW
CREAT SERPL-MCNC: 0.68 MG/DL
EGFR: 102 ML/MIN/1.73M2
GLUCOSE QUALITATIVE U: NEGATIVE
GLUCOSE QUALITATIVE U: NEGATIVE
GLUCOSE SERPL-MCNC: 89 MG/DL
HYALINE CASTS: 3 /LPF
KETONES URINE: NEGATIVE
KETONES URINE: NEGATIVE
LEUKOCYTE ESTERASE URINE: ABNORMAL
LEUKOCYTE ESTERASE URINE: ABNORMAL
MICROSCOPIC-UA: NORMAL
NITRITE URINE: NEGATIVE
NITRITE URINE: NEGATIVE
PH URINE: 6.5
PH URINE: 7
POTASSIUM SERPL-SCNC: 4.9 MMOL/L
PROT SERPL-MCNC: 6.7 G/DL
PROTEIN URINE: NORMAL
PROTEIN URINE: NORMAL
RED BLOOD CELLS URINE: 2 /HPF
SODIUM SERPL-SCNC: 140 MMOL/L
SPECIFIC GRAVITY URINE: 1.02
SPECIFIC GRAVITY URINE: 1.02
SQUAMOUS EPITHELIAL CELLS: 2 /HPF
UROBILINOGEN URINE: NORMAL
UROBILINOGEN URINE: NORMAL
WHITE BLOOD CELLS URINE: 31 /HPF

## 2022-03-31 ENCOUNTER — NON-APPOINTMENT (OUTPATIENT)
Age: 57
End: 2022-03-31

## 2022-04-06 ENCOUNTER — NON-APPOINTMENT (OUTPATIENT)
Age: 57
End: 2022-04-06

## 2022-04-06 ENCOUNTER — APPOINTMENT (OUTPATIENT)
Dept: UROLOGY | Facility: CLINIC | Age: 57
End: 2022-04-06
Payer: COMMERCIAL

## 2022-04-06 VITALS
TEMPERATURE: 95 F | SYSTOLIC BLOOD PRESSURE: 142 MMHG | DIASTOLIC BLOOD PRESSURE: 86 MMHG | BODY MASS INDEX: 22.99 KG/M2 | HEART RATE: 103 BPM | WEIGHT: 138 LBS | OXYGEN SATURATION: 99 % | HEIGHT: 65 IN

## 2022-04-06 LAB
BILIRUB UR QL STRIP: NEGATIVE
CLARITY UR: CLEAR
COLLECTION METHOD: NORMAL
GLUCOSE UR-MCNC: NEGATIVE
HCG UR QL: 0.2 EU/DL
HGB UR QL STRIP.AUTO: NEGATIVE
KETONES UR-MCNC: NEGATIVE
LEUKOCYTE ESTERASE UR QL STRIP: NORMAL
NITRITE UR QL STRIP: NEGATIVE
PH UR STRIP: 7
PROT UR STRIP-MCNC: NEGATIVE
SP GR UR STRIP: 1.01

## 2022-04-06 PROCEDURE — 81002 URINALYSIS NONAUTO W/O SCOPE: CPT

## 2022-04-06 PROCEDURE — 99203 OFFICE O/P NEW LOW 30 MIN: CPT

## 2022-04-06 NOTE — ASSESSMENT
[FreeTextEntry1] : We decided to start with Hiprex 1 g x 1 for 3 months and Vitamine C\par WE asked for US to exclude nephrolithiasis \par We inform patient about the US results and need for further assessment

## 2022-04-06 NOTE — HISTORY OF PRESENT ILLNESS
[FreeTextEntry1] : 56 year-old patient with recent history of UTI presented for the further assessment and possible treatment\par He recent UTI started with the cramps like pain/uncomfortable feeling in the low abdomen\par She got treatment\par Today feels much better and has no complaints \par Denied hemturia

## 2022-04-06 NOTE — REVIEW OF SYSTEMS
[Urine Infection (bladder/kidney)] : bladder/kidney infection [Told you have blood in urine on a urine test] : told blood was present in a urine test [Wake up at night to urinate  How many times?  ___] : wakes up to urinate [unfilled] times during the night [Strong urge to urinate] : strong urge to urinate [Bladder pressure] : experiences bladder pressure [Leakage of urine with urgency] : leakage of urine with urgency [Leakage of urine with straining, coughing, laughing] : leakage of urine with straining, coughing, laughing [Pain during urination] : denies pain during urination [Pain at onset of urination] : denies pain during onset of urination [Pain after urination] : denies pain after urination [Blood in urine that you can see] : denies seeing blood in urine [Discharge from urine canal] : denies discharge from urine canal [History of kidney stones] : denies history of kidney stones [Urine retention] : denies urine retention [Strain or push to urinate] : denies straining or pushing to urinate [Wait a long time to urinate] : denies waiting a long time to urinate [Slow urine stream] : denies slow urine stream [Interrupted urine stream] : denies interrupted urine stream [Bladder fullness after urinating] : denies bladder fullness after urinating [Increased pain/discomfort with bladder filling] : denies increased pain/discomfort with bladder filling [Bladder problems as child. If yes, describe..] : denies bladder problems as child [Unaware of when urine is leaking] : aware of when urine is leaking

## 2022-04-11 ENCOUNTER — APPOINTMENT (OUTPATIENT)
Dept: ULTRASOUND IMAGING | Facility: CLINIC | Age: 57
End: 2022-04-11
Payer: COMMERCIAL

## 2022-04-11 PROCEDURE — 76775 US EXAM ABDO BACK WALL LIM: CPT

## 2022-04-12 ENCOUNTER — APPOINTMENT (OUTPATIENT)
Dept: SURGERY | Facility: CLINIC | Age: 57
End: 2022-04-12
Payer: COMMERCIAL

## 2022-04-12 VITALS
SYSTOLIC BLOOD PRESSURE: 130 MMHG | BODY MASS INDEX: 22.33 KG/M2 | HEIGHT: 65 IN | WEIGHT: 134 LBS | DIASTOLIC BLOOD PRESSURE: 85 MMHG

## 2022-04-12 PROCEDURE — 99204 OFFICE O/P NEW MOD 45 MIN: CPT

## 2022-04-14 NOTE — ASSESSMENT
[FreeTextEntry1] : Patient with suspicious left thyroid nodule.  I recommended a left thyroid lobectomy for definitive diagnosis and treatment.  If metastatic lymph nodes are identified, a total thyroidectomy will be performed. I have reviewed the pathophysiology of the disease process, the area anatomy and the rationale for surgery.  I discussed the risks, benefits and alternative treatments which include but are not limited to bleeding, infection, numbness, hoarseness, hypocalcemia, scarring, and need for reoperation.  I have answered the patient's questions to their satisfaction.  The patient wishes to proceed with the recommended procedure.  They will contact my office to schedule surgery.\par

## 2022-04-14 NOTE — REASON FOR VISIT
[Initial Consultation] : an initial consultation for [Spouse] : spouse [FreeTextEntry2] : Follicular neoplasm of the thyroid

## 2022-04-14 NOTE — CONSULT LETTER
[Dear  ___] : Dear  [unfilled], [Consult Letter:] : I had the pleasure of evaluating your patient, [unfilled]. [Please see my note below.] : Please see my note below. [Consult Closing:] : Thank you very much for allowing me to participate in the care of this patient.  If you have any questions, please do not hesitate to contact me. [Sincerely,] : Sincerely, [FreeTextEntry2] : Dr. Mai Pink [FreeTextEntry3] : Barbara Watters MD, FACS\par Assistant Professor of Surgery and Otolaryngology\par [facility]\par  [DrNeeru  ___] : Dr. PARKER

## 2022-04-14 NOTE — HISTORY OF PRESENT ILLNESS
[de-identified] : Patient referred by Dr. Pink for evaluation of suspicious left thyroid nodule.  Thyroid ultrasound February 2022: Right lobe 4.4 x 1.4 x 1.3 cm, no nodules noted.  Left lobe 4.3 x 1.3 x 1 cm with mid 15 x 10 x 10 mm nodule additional 4 mm nodule noted.  Left nodule increased in size.  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  Patient denies dysphagia, change in voice, radiation exposure or prior thyroid dysfunction. I have reviewed all old and new data and available images.  Additional information was obtained from others present at the time of the visit to ensure the completeness of the history.\par

## 2022-04-14 NOTE — PHYSICAL EXAM
[de-identified] : no cervical or supraclavicular adenopathy, trachea midline, thyroid without enlargement or palpable mass [Normal] : no neck adenopathy [de-identified] : Skin:  normal appearance.  no rash, nodules, vesicles, or erythema,\par Musculoskeletal:  full range of motion and no deformities appreciated\par Neurological:  grossly intact\par Psychiatric:  oriented to person, place and time with appropriate affect

## 2022-04-15 ENCOUNTER — APPOINTMENT (OUTPATIENT)
Dept: FAMILY MEDICINE | Facility: CLINIC | Age: 57
End: 2022-04-15

## 2022-04-18 ENCOUNTER — TRANSCRIPTION ENCOUNTER (OUTPATIENT)
Age: 57
End: 2022-04-18

## 2022-04-20 ENCOUNTER — OUTPATIENT (OUTPATIENT)
Dept: OUTPATIENT SERVICES | Facility: HOSPITAL | Age: 57
LOS: 1 days | End: 2022-04-20
Payer: COMMERCIAL

## 2022-04-20 VITALS
HEIGHT: 65.5 IN | DIASTOLIC BLOOD PRESSURE: 88 MMHG | RESPIRATION RATE: 16 BRPM | TEMPERATURE: 98 F | WEIGHT: 134.92 LBS | SYSTOLIC BLOOD PRESSURE: 138 MMHG | HEART RATE: 88 BPM | OXYGEN SATURATION: 98 %

## 2022-04-20 DIAGNOSIS — I10 ESSENTIAL (PRIMARY) HYPERTENSION: ICD-10-CM

## 2022-04-20 DIAGNOSIS — G47.33 OBSTRUCTIVE SLEEP APNEA (ADULT) (PEDIATRIC): ICD-10-CM

## 2022-04-20 DIAGNOSIS — D49.7 NEOPLASM OF UNSPECIFIED BEHAVIOR OF ENDOCRINE GLANDS AND OTHER PARTS OF NERVOUS SYSTEM: ICD-10-CM

## 2022-04-20 DIAGNOSIS — R20.0 ANESTHESIA OF SKIN: ICD-10-CM

## 2022-04-20 LAB
ANION GAP SERPL CALC-SCNC: 11 MMOL/L — SIGNIFICANT CHANGE UP (ref 7–14)
BUN SERPL-MCNC: 13 MG/DL — SIGNIFICANT CHANGE UP (ref 7–23)
CALCIUM SERPL-MCNC: 9.4 MG/DL — SIGNIFICANT CHANGE UP (ref 8.4–10.5)
CHLORIDE SERPL-SCNC: 103 MMOL/L — SIGNIFICANT CHANGE UP (ref 98–107)
CO2 SERPL-SCNC: 26 MMOL/L — SIGNIFICANT CHANGE UP (ref 22–31)
CREAT SERPL-MCNC: 0.63 MG/DL — SIGNIFICANT CHANGE UP (ref 0.5–1.3)
EGFR: 104 ML/MIN/1.73M2 — SIGNIFICANT CHANGE UP
GLUCOSE SERPL-MCNC: 82 MG/DL — SIGNIFICANT CHANGE UP (ref 70–99)
HCT VFR BLD CALC: 39.3 % — SIGNIFICANT CHANGE UP (ref 34.5–45)
HGB BLD-MCNC: 13.4 G/DL — SIGNIFICANT CHANGE UP (ref 11.5–15.5)
MCHC RBC-ENTMCNC: 31.2 PG — SIGNIFICANT CHANGE UP (ref 27–34)
MCHC RBC-ENTMCNC: 34.1 GM/DL — SIGNIFICANT CHANGE UP (ref 32–36)
MCV RBC AUTO: 91.4 FL — SIGNIFICANT CHANGE UP (ref 80–100)
NRBC # BLD: 0 /100 WBCS — SIGNIFICANT CHANGE UP
NRBC # FLD: 0 K/UL — SIGNIFICANT CHANGE UP
PLATELET # BLD AUTO: 247 K/UL — SIGNIFICANT CHANGE UP (ref 150–400)
POTASSIUM SERPL-MCNC: 4.3 MMOL/L — SIGNIFICANT CHANGE UP (ref 3.5–5.3)
POTASSIUM SERPL-SCNC: 4.3 MMOL/L — SIGNIFICANT CHANGE UP (ref 3.5–5.3)
RBC # BLD: 4.3 M/UL — SIGNIFICANT CHANGE UP (ref 3.8–5.2)
RBC # FLD: 11.5 % — SIGNIFICANT CHANGE UP (ref 10.3–14.5)
SODIUM SERPL-SCNC: 140 MMOL/L — SIGNIFICANT CHANGE UP (ref 135–145)
WBC # BLD: 7 K/UL — SIGNIFICANT CHANGE UP (ref 3.8–10.5)
WBC # FLD AUTO: 7 K/UL — SIGNIFICANT CHANGE UP (ref 3.8–10.5)

## 2022-04-20 PROCEDURE — 93010 ELECTROCARDIOGRAM REPORT: CPT

## 2022-04-20 NOTE — H&P PST ADULT - HISTORY OF PRESENT ILLNESS
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

## 2022-04-20 NOTE — H&P PST ADULT - NECK DETAILS
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

## 2022-04-20 NOTE — H&P PST ADULT - ENDOCRINE COMMENTS
Pt says her PCP  noted a lump on her left side of neck. Was evaluated by endocrinologist with thyroid  U/S which showed  thyroid nodule with  left nodule increased in size . Biopsy done.

## 2022-04-20 NOTE — H&P PST ADULT - ASSESSMENT
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

## 2022-04-20 NOTE — H&P PST ADULT - PROBLEM SELECTOR PLAN 1
Patient tentatively scheduled for  left thyroid lobectomy with paratracheal dissection , possible total on 05/04/2022  Pre-op instructions provided. Pt given verbal and written instructions with teach back on chlorhexidine shampoo and pepcid. Pt verbalized understanding with return demonstration.   Pt strongly advised to follow up with surgeon to discuss COVID testing requirements prior to procedure.

## 2022-04-20 NOTE — H&P PST ADULT - HAVE YOU HAD A FIRST COVID-19 BOOSTER?
Follow up rash on bilateral arms x 3 days. Noted after swimming lessons.   Taking antibiotic. No sore throat.   Afebrile x 2 days  Medications verified, no changes.  Denies known Latex allergy or symptoms of Latex sensitivity.    Patient would like communication of their results via:        Cell Phone:   Telephone Information:   Mobile [phone]   Mobile [phone]     Okay to leave a message containing results? Yes     Yes

## 2022-04-20 NOTE — H&P PST ADULT - TOBACCO USE
Attempt to complete discharge teaching in the event patient is discharged over the weekend, but patient insist is in to much pain to cooperate. Will attempt again as able.    Former smoker

## 2022-04-20 NOTE — H&P PST ADULT - NSANTHOSAYNRD_GEN_A_CORE
No. INOCENTE screening performed.  STOP BANG Legend: 0-2 = LOW Risk; 3-4 = INTERMEDIATE Risk; 5-8 = HIGH Risk

## 2022-04-20 NOTE — H&P PST ADULT - ANESTHESIA, PREVIOUS REACTION, PROFILE
2003 - After vascular surgery - post op - woke up with whole body shaking . Denies family Hx of malignant hyperthermia
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
## 2022-05-03 ENCOUNTER — NON-APPOINTMENT (OUTPATIENT)
Age: 57
End: 2022-05-03

## 2022-05-16 DIAGNOSIS — Z92.29 PERSONAL HISTORY OF OTHER DRUG THERAPY: ICD-10-CM

## 2022-05-16 DIAGNOSIS — Z11.1 ENCOUNTER FOR SCREENING FOR RESPIRATORY TUBERCULOSIS: ICD-10-CM

## 2022-05-16 DIAGNOSIS — Z13.29 ENCOUNTER FOR SCREENING FOR OTHER SUSPECTED ENDOCRINE DISORDER: ICD-10-CM

## 2022-05-16 DIAGNOSIS — Z13.0 ENCOUNTER FOR SCREENING FOR DISEASES OF THE BLOOD AND BLOOD-FORMING ORGANS AND CERTAIN DISORDERS INVOLVING THE IMMUNE MECHANISM: ICD-10-CM

## 2022-05-16 DIAGNOSIS — Z00.00 ENCOUNTER FOR GENERAL ADULT MEDICAL EXAMINATION W/OUT ABNORMAL FINDINGS: ICD-10-CM

## 2022-05-16 DIAGNOSIS — Z71.89 OTHER SPECIFIED COUNSELING: ICD-10-CM

## 2022-05-16 DIAGNOSIS — Z11.59 ENCOUNTER FOR SCREENING FOR OTHER VIRAL DISEASES: ICD-10-CM

## 2022-05-16 DIAGNOSIS — Z11.4 ENCOUNTER FOR SCREENING FOR HUMAN IMMUNODEFICIENCY VIRUS [HIV]: ICD-10-CM
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
## 2022-05-25 PROBLEM — I10 ESSENTIAL (PRIMARY) HYPERTENSION: Chronic | Status: ACTIVE | Noted: 2022-04-20

## 2022-05-25 PROBLEM — F41.9 ANXIETY DISORDER, UNSPECIFIED: Chronic | Status: ACTIVE | Noted: 2022-04-20

## 2022-05-25 PROBLEM — N39.0 URINARY TRACT INFECTION, SITE NOT SPECIFIED: Chronic | Status: ACTIVE | Noted: 2022-04-20

## 2022-05-25 PROBLEM — D49.7 NEOPLASM OF UNSPECIFIED BEHAVIOR OF ENDOCRINE GLANDS AND OTHER PARTS OF NERVOUS SYSTEM: Chronic | Status: ACTIVE | Noted: 2022-04-20

## 2022-05-26 ENCOUNTER — OUTPATIENT (OUTPATIENT)
Dept: OUTPATIENT SERVICES | Facility: HOSPITAL | Age: 57
LOS: 1 days | End: 2022-05-26

## 2022-05-26 VITALS
WEIGHT: 136.03 LBS | OXYGEN SATURATION: 98 % | RESPIRATION RATE: 14 BRPM | HEART RATE: 76 BPM | HEIGHT: 64 IN | TEMPERATURE: 99 F | SYSTOLIC BLOOD PRESSURE: 120 MMHG | DIASTOLIC BLOOD PRESSURE: 78 MMHG

## 2022-05-26 DIAGNOSIS — Z90.49 ACQUIRED ABSENCE OF OTHER SPECIFIED PARTS OF DIGESTIVE TRACT: Chronic | ICD-10-CM

## 2022-05-26 DIAGNOSIS — Z98.890 OTHER SPECIFIED POSTPROCEDURAL STATES: Chronic | ICD-10-CM

## 2022-05-26 DIAGNOSIS — D49.7 NEOPLASM OF UNSPECIFIED BEHAVIOR OF ENDOCRINE GLANDS AND OTHER PARTS OF NERVOUS SYSTEM: ICD-10-CM

## 2022-05-26 DIAGNOSIS — I10 ESSENTIAL (PRIMARY) HYPERTENSION: ICD-10-CM

## 2022-05-26 NOTE — H&P PST ADULT - MUSCULOSKELETAL
I will START or STAY ON the medications listed below when I get home from the hospital:    Percocet 5/325 oral tablet  -- 1 tab(s) by mouth every 6 hours, As Needed -for severe pain MDD:4 tabs   -- Caution federal law prohibits the transfer of this drug to any person other  than the person for whom it was prescribed.  May cause drowsiness.  Alcohol may intensify this effect.  Use care when operating dangerous machinery.  This prescription cannot be refilled.  This product contains acetaminophen.  Do not use  with any other product containing acetaminophen to prevent possible liver damage.  Using more of this medication than prescribed may cause serious breathing problems.    -- Indication: For Severe pain    Crestor 10 mg oral tablet  -- 1 tab(s) by mouth once a day (at bedtime)  -- Indication: For Cholesterol    amLODIPine 10 mg oral tablet  -- 1 tab(s) by mouth once a day  -- Indication: For Hypertension details… No joint pain, swelling or deformity; no limitation of movement

## 2022-05-26 NOTE — H&P PST ADULT - NEGATIVE ENMT SYMPTOMS
no hearing difficulty/no ear pain/no tinnitus/no sinus symptoms/no nasal congestion/no dry mouth/no throat pain/no dysphagia
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
## 2022-05-26 NOTE — H&P PST ADULT - PROBLEM SELECTOR PLAN 1
Assessment and Plan: Patient scheduled for surgery on   Patient provided with verbal and written presurgical instructions; verbalized understanding  with teach back.    Patient provided with famotidine for GI prophylaxis; verbalized understanding.    Patient provided with Chlorhexidine wash, verbal and written instructions reviewed. Patient demonstrated understanding with teach back.   Patient instructed to call surgeon to schedule COVID appointment     EKG and echo in chart Assessment and Plan: Patient scheduled for surgery on 6/15/22  Patient provided with verbal and written presurgical instructions; verbalized understanding  with teach back.    Patient provided with famotidine for GI prophylaxis; verbalized understanding.    Patient provided with Chlorhexidine wash, verbal and written instructions reviewed. Patient demonstrated understanding with teach back.   Patient instructed to call surgeon to schedule COVID appointment     EKG and echo in chart

## 2022-05-26 NOTE — H&P PST ADULT - NECK DETAILS
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

## 2022-05-26 NOTE — H&P PST ADULT - ANESTHESIA, PREVIOUS REACTION, PROFILE
2003 - After vascular surgery - post op - woke up with whole body shaking and elevated heart rate; after rhinoplasty woke up in the middle of surgery  . Denies family Hx of malignant hyperthermia/heart complications

## 2022-05-26 NOTE — H&P PST ADULT - HISTORY OF PRESENT ILLNESS
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

## 2022-05-26 NOTE — H&P PST ADULT - PROBLEM SELECTOR PLAN 2
Assessment and Plan: Patient instructed to take  on day of procedure, verbalized understanding.  Patient instructed to hold  on DOS, verbalized understanding Assessment and Plan: Patient instructed to take losartan on day of procedure, verbalized understanding.

## 2022-05-31 ENCOUNTER — NON-APPOINTMENT (OUTPATIENT)
Age: 57
End: 2022-05-31

## 2022-06-14 ENCOUNTER — TRANSCRIPTION ENCOUNTER (OUTPATIENT)
Age: 57
End: 2022-06-14

## 2022-06-14 NOTE — ASU PATIENT PROFILE, ADULT - VISION (WITH CORRECTIVE LENSES IF THE PATIENT USUALLY WEARS THEM):
Partially impaired: cannot see medication labels or newsprint, but can see obstacles in path, and the surrounding layout; can count fingers at arm's length wears contact lenses/Partially impaired: cannot see medication labels or newsprint, but can see obstacles in path, and the surrounding layout; can count fingers at arm's length

## 2022-06-14 NOTE — ASU PATIENT PROFILE, ADULT - FALL HARM RISK - UNIVERSAL INTERVENTIONS
Bed in lowest position, wheels locked, appropriate side rails in place/Call bell, personal items and telephone in reach/Instruct patient to call for assistance before getting out of bed or chair/Non-slip footwear when patient is out of bed/Thor to call system/Physically safe environment - no spills, clutter or unnecessary equipment/Purposeful Proactive Rounding/Room/bathroom lighting operational, light cord in reach

## 2022-06-15 ENCOUNTER — APPOINTMENT (OUTPATIENT)
Dept: SURGERY | Facility: HOSPITAL | Age: 57
End: 2022-06-15
Payer: COMMERCIAL

## 2022-06-15 ENCOUNTER — NON-APPOINTMENT (OUTPATIENT)
Age: 57
End: 2022-06-15

## 2022-06-15 ENCOUNTER — TRANSCRIPTION ENCOUNTER (OUTPATIENT)
Age: 57
End: 2022-06-15

## 2022-06-15 ENCOUNTER — OUTPATIENT (OUTPATIENT)
Dept: OUTPATIENT SERVICES | Facility: HOSPITAL | Age: 57
LOS: 1 days | Discharge: HOME | End: 2022-06-15
Payer: COMMERCIAL

## 2022-06-15 VITALS
HEART RATE: 105 BPM | OXYGEN SATURATION: 97 % | RESPIRATION RATE: 14 BRPM | SYSTOLIC BLOOD PRESSURE: 143 MMHG | DIASTOLIC BLOOD PRESSURE: 90 MMHG

## 2022-06-15 VITALS
WEIGHT: 136.03 LBS | TEMPERATURE: 98 F | HEIGHT: 64 IN | DIASTOLIC BLOOD PRESSURE: 100 MMHG | RESPIRATION RATE: 18 BRPM | HEART RATE: 102 BPM | OXYGEN SATURATION: 100 % | SYSTOLIC BLOOD PRESSURE: 168 MMHG

## 2022-06-15 DIAGNOSIS — Z90.49 ACQUIRED ABSENCE OF OTHER SPECIFIED PARTS OF DIGESTIVE TRACT: Chronic | ICD-10-CM

## 2022-06-15 DIAGNOSIS — Z98.890 OTHER SPECIFIED POSTPROCEDURAL STATES: Chronic | ICD-10-CM

## 2022-06-15 DIAGNOSIS — D49.7 NEOPLASM OF UNSPECIFIED BEHAVIOR OF ENDOCRINE GLANDS AND OTHER PARTS OF NERVOUS SYSTEM: ICD-10-CM

## 2022-06-15 PROCEDURE — 13132 CMPLX RPR F/C/C/M/N/AX/G/H/F: CPT | Mod: 59

## 2022-06-15 PROCEDURE — 60252 REMOVAL OF THYROID: CPT

## 2022-06-15 RX ORDER — SODIUM CHLORIDE 9 MG/ML
1000 INJECTION, SOLUTION INTRAVENOUS
Refills: 0 | Status: DISCONTINUED | OUTPATIENT
Start: 2022-06-15 | End: 2022-06-15

## 2022-06-15 RX ORDER — METHENAMINE MANDELATE 1 G
0 TABLET ORAL
Qty: 0 | Refills: 0 | DISCHARGE

## 2022-06-15 RX ORDER — HYDROXYCHLOROQUINE SULFATE 200 MG
1 TABLET ORAL
Qty: 0 | Refills: 0 | DISCHARGE

## 2022-06-15 RX ORDER — BENZOCAINE AND MENTHOL 5; 1 G/100ML; G/100ML
1 LIQUID ORAL
Qty: 0 | Refills: 0 | DISCHARGE
Start: 2022-06-15

## 2022-06-15 RX ORDER — ALPRAZOLAM 0.25 MG
1 TABLET ORAL
Qty: 0 | Refills: 0 | DISCHARGE

## 2022-06-15 RX ORDER — LOSARTAN POTASSIUM 100 MG/1
1 TABLET, FILM COATED ORAL
Qty: 0 | Refills: 0 | DISCHARGE

## 2022-06-15 RX ORDER — SODIUM CHLORIDE 9 MG/ML
1000 INJECTION, SOLUTION INTRAVENOUS
Refills: 0 | Status: DISCONTINUED | OUTPATIENT
Start: 2022-06-15 | End: 2022-06-29

## 2022-06-15 RX ORDER — ACETAMINOPHEN 500 MG
2 TABLET ORAL
Qty: 0 | Refills: 0 | DISCHARGE
Start: 2022-06-15

## 2022-06-15 RX ORDER — BENZOCAINE AND MENTHOL 5; 1 G/100ML; G/100ML
1 LIQUID ORAL
Refills: 0 | Status: DISCONTINUED | OUTPATIENT
Start: 2022-06-15 | End: 2022-06-29

## 2022-06-15 RX ORDER — ACETAMINOPHEN 500 MG
650 TABLET ORAL EVERY 6 HOURS
Refills: 0 | Status: DISCONTINUED | OUTPATIENT
Start: 2022-06-15 | End: 2022-06-29

## 2022-06-15 NOTE — ASU DISCHARGE PLAN (ADULT/PEDIATRIC) - NURSING INSTRUCTIONS
You were given intravenous TYLENOL for pain management at 8 am Please DO NOT take any products containing TYLENOL or ACETAMINOPHEN, such as VICODIN, PERCOCET, EXCEDRIN, and any over-the-counter cold medication for the next 6 hours (until  2 pm. DO NOT TAKE MORE THAN 3000 MG OF TYLENOL in a 24 hour period.

## 2022-06-15 NOTE — ASU DISCHARGE PLAN (ADULT/PEDIATRIC) - NS MD DC FALL RISK RISK
For information on Fall & Injury Prevention, visit: [email or web address] OR  [email or web address] OR  https://www.cdc.gov/steadi/patient.html

## 2022-06-15 NOTE — BRIEF OPERATIVE NOTE - OPERATION/FINDINGS
Left thyroid lobectomy and paratracheal lymph node excision. Linear incision made along the anterior-inferior neck. Left thyroid identified and resected, minimal EBL. Primary closure.

## 2022-06-15 NOTE — ASU DISCHARGE PLAN (ADULT/PEDIATRIC) - CARE PROVIDER_API CALL
Barbara Watters (MD)  Surgery  [address], [address]  [place] [address]  Phone: [phone]  Fax: [phone]  Scheduled Appointment: 06/23/2022

## 2022-06-15 NOTE — BRIEF OPERATIVE NOTE - NSICDXBRIEFPROCEDURE_GEN_ALL_CORE_FT
PROCEDURES:  Left thyroid lobectomy 15-Syed-2022 09:04:08  Troy Franco  Excision, lymph node, paratracheal 15-Syed-2022 09:04:37  Troy Franco

## 2022-06-16 ENCOUNTER — RESULT REVIEW (OUTPATIENT)
Age: 57
End: 2022-06-16

## 2022-06-16 PROBLEM — Z87.2 PERSONAL HISTORY OF DISEASES OF THE SKIN AND SUBCUTANEOUS TISSUE: Chronic | Status: ACTIVE | Noted: 2022-05-26

## 2022-06-16 PROBLEM — M35.9 SYSTEMIC INVOLVEMENT OF CONNECTIVE TISSUE, UNSPECIFIED: Chronic | Status: ACTIVE | Noted: 2022-05-26

## 2022-06-16 PROCEDURE — 88331 PATH CONSLTJ SURG 1 BLK 1SPC: CPT | Mod: 26

## 2022-06-16 PROCEDURE — 88305 TISSUE EXAM BY PATHOLOGIST: CPT | Mod: 26

## 2022-06-16 PROCEDURE — 88307 TISSUE EXAM BY PATHOLOGIST: CPT | Mod: 26

## 2022-06-20 ENCOUNTER — RX RENEWAL (OUTPATIENT)
Age: 57
End: 2022-06-20

## 2022-06-22 LAB — SURGICAL PATHOLOGY STUDY: SIGNIFICANT CHANGE UP

## 2022-06-23 ENCOUNTER — APPOINTMENT (OUTPATIENT)
Dept: SURGERY | Facility: CLINIC | Age: 57
End: 2022-06-23
Payer: COMMERCIAL

## 2022-06-23 DIAGNOSIS — E04.9 NONTOXIC GOITER, UNSPECIFIED: ICD-10-CM

## 2022-06-23 DIAGNOSIS — D34 BENIGN NEOPLASM OF THYROID GLAND: ICD-10-CM

## 2022-06-23 DIAGNOSIS — D49.7 NEOPLASM OF UNSPECIFIED BEHAVIOR OF ENDOCRINE GLANDS AND OTHER PARTS OF NERVOUS SYSTEM: ICD-10-CM

## 2022-06-23 PROCEDURE — 99024 POSTOP FOLLOW-UP VISIT: CPT

## 2022-06-23 NOTE — ASSESSMENT
[FreeTextEntry1] : Patient with suspicious left thyroid nodule, benign on final pathology.  Doing well postop.  Patient will see Dr. Pink in 1 month.  RTO 4 months. I have answered their questions to the best of my ability.\par

## 2022-06-23 NOTE — PHYSICAL EXAM
[de-identified] : Incision healing with min swelling, scar min discussed. no cervical or supraclavicular adenopathy, trachea midline, thyroid without enlargement or palpable mass [Normal] : no neck adenopathy [de-identified] : Skin:  normal appearance.  no rash, nodules, vesicles, or erythema,\par Musculoskeletal:  full range of motion and no deformities appreciated\par Neurological:  grossly intact\par Psychiatric:  oriented to person, place and time with appropriate affect

## 2022-06-23 NOTE — HISTORY OF PRESENT ILLNESS
[de-identified] : Patient referred by Dr. Pink for evaluation of suspicious left thyroid nodule.  Thyroid ultrasound February 2022: Right lobe 4.4 x 1.4 x 1.3 cm, no nodules noted.  Left lobe 4.3 x 1.3 x 1 cm with mid 15 x 10 x 10 mm nodule additional 4 mm nodule noted.  Left nodule increased in size.  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  Patient denies dysphagia, change in voice, radiation exposure or prior thyroid dysfunction.\par 6/15/22 left thyroid lobectomy. path benign.  Patient denies dysphagia, hoarseness, pain. .\par  I have reviewed all old and new data and available images.  Additional information was obtained from others present at the time of the visit to ensure the completeness of the history.\par

## 2022-07-06 ENCOUNTER — APPOINTMENT (OUTPATIENT)
Dept: FAMILY MEDICINE | Facility: CLINIC | Age: 57
End: 2022-07-06

## 2022-07-06 VITALS
HEIGHT: 65 IN | SYSTOLIC BLOOD PRESSURE: 132 MMHG | BODY MASS INDEX: 22.82 KG/M2 | DIASTOLIC BLOOD PRESSURE: 88 MMHG | OXYGEN SATURATION: 98 % | TEMPERATURE: 97.6 F | RESPIRATION RATE: 15 BRPM | HEART RATE: 101 BPM | WEIGHT: 137 LBS

## 2022-07-06 VITALS — SYSTOLIC BLOOD PRESSURE: 150 MMHG | DIASTOLIC BLOOD PRESSURE: 80 MMHG

## 2022-07-06 VITALS — DIASTOLIC BLOOD PRESSURE: 80 MMHG | SYSTOLIC BLOOD PRESSURE: 148 MMHG

## 2022-07-06 DIAGNOSIS — R42 DIZZINESS AND GIDDINESS: ICD-10-CM

## 2022-07-06 PROCEDURE — 99214 OFFICE O/P EST MOD 30 MIN: CPT

## 2022-07-06 RX ORDER — B-COMPLEX WITH VITAMIN C
600-200 TABLET ORAL
Refills: 0 | Status: ACTIVE | COMMUNITY

## 2022-07-06 RX ORDER — MULTIVIT-MIN/IRON/FOLIC ACID/K 18-600-40
500 CAPSULE ORAL
Qty: 60 | Refills: 2 | Status: COMPLETED | COMMUNITY
Start: 2022-04-06 | End: 2022-07-06

## 2022-07-06 RX ORDER — MULTIVITAMIN
TABLET ORAL
Refills: 0 | Status: ACTIVE | COMMUNITY

## 2022-07-06 NOTE — PHYSICAL EXAM
[Normal] : no respiratory distress, lungs were clear to auscultation bilaterally and no accessory muscle use [Tachycardia] : tachycardic [Normal S1] : normal S1 [Normal S2] : normal S2 [de-identified] : eomi intact [de-identified] : left nostril turbinate red not swollen [de-identified] : scar swollen [de-identified] : neg dicks cline pike maneuver  no nystagmus

## 2022-07-06 NOTE — HISTORY OF PRESENT ILLNESS
[FreeTextEntry8] : Patient presents with slight vertigo, she feels better today. It originally started in Sunday, she felt like she was on a ship, very off balance.\par She just had surgery partial thyroidectomy  2 weeks ago\par july 3 she woke up and turned her head left and felt off balance. She also noticed it when she went to  her  small dog she was unbalanced. she is also congested. she hasn’t been taking mucinex. she wondered if it was related to an inner ear. she was worried she would go in to spinning episode. she has no headache she has no nasal congestion she has post nasal drip. she sees a chiro for her neck issues but bc of the thyroidectomy she did not see her chiropractor. \par she has partial thyroidectomy - benign pathology :)

## 2022-07-06 NOTE — ASSESSMENT
[FreeTextEntry1] : pt present for vertigo which is already improved somewhat. Her ears look fine. just a little red in left nostril she just had partial thyroidectomy so was not able to do normal chiropractic.Perhaps she was not able to get her head drained due to surgery and she got sinusey. neg nystagmus. neg orthostatics\par Discussion and interpretation of previous tests , external notes( labs, radiology, specialist  , patient verbalized understanding.\par Prescription drug management\par take flonase

## 2022-07-06 NOTE — REVIEW OF SYSTEMS
[Fatigue] : fatigue [Earache] : earache [Dizziness] : dizziness [Negative] : Respiratory [Headache] : no headache [Fainting] : no fainting [Confusion] : no confusion [de-identified] : scar

## 2022-07-20 ENCOUNTER — APPOINTMENT (OUTPATIENT)
Dept: ENDOCRINOLOGY | Facility: CLINIC | Age: 57
End: 2022-07-20

## 2022-07-20 VITALS
HEART RATE: 82 BPM | WEIGHT: 138 LBS | OXYGEN SATURATION: 98 % | HEIGHT: 65 IN | BODY MASS INDEX: 22.99 KG/M2 | SYSTOLIC BLOOD PRESSURE: 126 MMHG | DIASTOLIC BLOOD PRESSURE: 88 MMHG

## 2022-07-20 DIAGNOSIS — Z86.39 PERSONAL HISTORY OF OTHER ENDOCRINE, NUTRITIONAL AND METABOLIC DISEASE: ICD-10-CM

## 2022-07-20 PROCEDURE — 99214 OFFICE O/P EST MOD 30 MIN: CPT

## 2022-07-20 NOTE — ASSESSMENT
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 likely with hypothyroidism\par rtc in 3months with labs\par labs in the office today\par \par Hashimoto's- currently euthyroid. but symptomatic karime after lobectomy   discussed implications of needing LT4 in the future. does not improve with diet or exercise. also discussed possibly starting a trial of LT4 and if starting, would be low dose and needs repeat labs and educated on hyperthyroid sx if meds are too much. all questions and concerns addressed.\par even with normal tsh, would try low dose LT4 25mcg daily\par \par Left thyroid nodule- s/p L lobectomy 6/15/22 for AUS +thyroseq NRAS w/ Dr. Watters. R side w/o nodules but can repeat ultrasound next year.\par

## 2022-07-20 NOTE — PHYSICAL EXAM
[Alert] : alert [Well Nourished] : well nourished [No Acute Distress] : no acute distress [Well Developed] : well developed [Normal Sclera/Conjunctiva] : normal sclera/conjunctiva [EOMI] : extra ocular movement intact [No Proptosis] : no proptosis [Normal Oropharynx] : the oropharynx was normal [No Thyroid Nodules] : no palpable thyroid nodules [No Respiratory Distress] : no respiratory distress [No Accessory Muscle Use] : no accessory muscle use [Clear to Auscultation] : lungs were clear to auscultation bilaterally [Normal S1, S2] : normal S1 and S2 [Normal Rate] : heart rate was normal [Regular Rhythm] : with a regular rhythm [Normal Bowel Sounds] : normal bowel sounds [Not Tender] : non-tender [Not Distended] : not distended [Soft] : abdomen soft [No Stigmata of Cushings Syndrome] : no stigmata of Cushings Syndrome [Normal Gait] : normal gait [Normal Strength/Tone] : muscle strength and tone were normal [No Rash] : no rash [Oriented x3] : oriented to person, place, and time [Normal Affect] : the affect was normal [Normal Mood] : the mood was normal [Well Healed Scar] : well healed scar [No Tremors] : no tremors [Acanthosis Nigricans] : no acanthosis nigricans [de-identified] : left lobectomy [de-identified] : delayed reflexes

## 2022-07-20 NOTE — HISTORY OF PRESENT ILLNESS
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

## 2022-07-22 LAB
T4 FREE SERPL-MCNC: 0.8 NG/DL
TSH SERPL-ACNC: 3.36 UIU/ML

## 2022-07-23 ENCOUNTER — RX RENEWAL (OUTPATIENT)
Age: 57
End: 2022-07-23

## 2022-08-09 ENCOUNTER — APPOINTMENT (OUTPATIENT)
Dept: ENDOCRINOLOGY | Facility: CLINIC | Age: 57
End: 2022-08-09

## 2022-08-30 ENCOUNTER — APPOINTMENT (OUTPATIENT)
Dept: FAMILY MEDICINE | Facility: CLINIC | Age: 57
End: 2022-08-30

## 2022-08-30 VITALS — TEMPERATURE: 97.3 F

## 2022-08-30 VITALS
HEART RATE: 96 BPM | DIASTOLIC BLOOD PRESSURE: 90 MMHG | OXYGEN SATURATION: 98 % | SYSTOLIC BLOOD PRESSURE: 140 MMHG | WEIGHT: 137 LBS | BODY MASS INDEX: 22.82 KG/M2 | RESPIRATION RATE: 15 BRPM | HEIGHT: 65 IN

## 2022-08-30 PROCEDURE — 99396 PREV VISIT EST AGE 40-64: CPT | Mod: 25

## 2022-08-30 PROCEDURE — G0444 DEPRESSION SCREEN ANNUAL: CPT | Mod: 59

## 2022-08-30 RX ORDER — LOSARTAN POTASSIUM 25 MG/1
25 TABLET, FILM COATED ORAL DAILY
Qty: 90 | Refills: 0 | Status: DISCONTINUED | COMMUNITY
Start: 2022-03-24 | End: 2022-08-30

## 2022-08-30 RX ORDER — METHENAMINE HIPPURATE 1 G/1
1 TABLET ORAL
Qty: 90 | Refills: 0 | Status: DISCONTINUED | COMMUNITY
Start: 2022-04-06 | End: 2022-08-30

## 2022-08-30 RX ORDER — NITROFURANTOIN (MONOHYDRATE/MACROCRYSTALS) 25; 75 MG/1; MG/1
100 CAPSULE ORAL
Qty: 14 | Refills: 0 | Status: DISCONTINUED | COMMUNITY
Start: 2021-12-15 | End: 2022-08-30

## 2022-08-30 NOTE — HISTORY OF PRESENT ILLNESS
[FreeTextEntry1] : Patient presents for physical [de-identified] : 56yio F presents for cpe\par she is feeling well\par \par Denies chest pain, palpitations, shortness of breath, vision changes or LE edema\par hx chronic migraines\par \par bp 153/86  140/99  1388/60  144/74  124/68  113/64  127/85  126/54  140/82  117/67\par \par

## 2022-08-30 NOTE — REVIEW OF SYSTEMS
[Hot Flashes] : hot flashes [Negative] : Heme/Lymph [Fever] : no fever [Chills] : no chills [Night Sweats] : no night sweats [Recent Change In Weight] : ~T no recent weight change [Chest Pain] : no chest pain [Palpitations] : no palpitations [Shortness Of Breath] : no shortness of breath [Cough] : no cough [Abdominal Pain] : no abdominal pain [Nausea] : no nausea [Constipation] : no constipation [Diarrhea] : diarrhea [Vomiting] : no vomiting [Melena] : no melena [Dysuria] : no dysuria [Hematuria] : no hematuria [Frequency] : no frequency [Itching] : no itching [Mole Changes] : no mole changes [Skin Rash] : no skin rash [Dizziness] : no dizziness [Fainting] : no fainting [Depression] : no depression [FreeTextEntry4] : denies throat pain  [de-identified] : migraines are improving  [de-identified] : anxiety at times but not affecting daily life

## 2022-08-30 NOTE — PLAN
[FreeTextEntry1] : \par CPE\par -Offered HIV/ STD/ Hep C Screening declined \par -Mammogram scheduled in october \par -Colonoscopy Screening scheduled in october \par -Advised annual ophthalmology, dental and dermatology visits\par -Advised monthly breast exams\par -Annual depression screening - negative \par had labs w/ rheumatologist Dr Charlie mullen will obtain results \par \par recent thyroid lobectomy now on levothyroxine\par will  f/u w/ endocrine Dr Pink\par \par tremor due to levothyroxine vs caffeine? very mild \par advised to monitor and if no relief to see neurologist\par will f/u w/ endocrine 1 month\par \par HTN\par elevated in office today, wnl at night, she is drinking caffeine in the mornings\par advised to check BP in the morning before caffeine and if still high we may need to increase dose of losartan\par she  will send me a portal message w/ the bp readings\par \par f/u if bp rising pt agreed w/plan\par

## 2022-08-30 NOTE — PHYSICAL EXAM
[No Lymphadenopathy] : no lymphadenopathy [Supple] : supple [No Edema] : there was no peripheral edema [Declined Breast Exam] : declined breast exam  [Normal] : soft, non-tender, non-distended, no masses palpated, no HSM and normal bowel sounds [Normal Posterior Cervical Nodes] : no posterior cervical lymphadenopathy [Normal Anterior Cervical Nodes] : no anterior cervical lymphadenopathy [No CVA Tenderness] : no CVA  tenderness [No Rash] : no rash [No Focal Deficits] : no focal deficits [Normal Affect] : the affect was normal [Normal Mood] : the mood was normal [Normal Insight/Judgement] : insight and judgment were intact [de-identified] : +left tonsil enlarged  [de-identified] : scar on neck noted  [de-identified] : no calf tenderness b/l LE [de-identified] : CN 2-12 INTACT, NORMAL STRENGTH UPPER AND LOWER EXT B/L 5/5, NORMAL RAPID ALTERNATING MOVEMENTS,  FINGER TO NOSE WITH VERY MILD TREMOR ON LEFT SIDE

## 2022-08-30 NOTE — HEALTH RISK ASSESSMENT
[No falls in past year] : Patient reported no falls in the past year [0] : 2) Feeling down, depressed, or hopeless: Not at all (0) [PHQ-2 Negative - No further assessment needed] : PHQ-2 Negative - No further assessment needed [Smoke Detector] : smoke detector [Carbon Monoxide Detector] : carbon monoxide detector [Seat Belt] :  uses seat belt [Sunscreen] : uses sunscreen [[id number]] : 0

## 2022-08-31 ENCOUNTER — TRANSCRIPTION ENCOUNTER (OUTPATIENT)
Age: 57
End: 2022-08-31

## 2022-08-31 VITALS — SYSTOLIC BLOOD PRESSURE: 128 MMHG | DIASTOLIC BLOOD PRESSURE: 86 MMHG

## 2022-09-05 ENCOUNTER — RX RENEWAL (OUTPATIENT)
Age: 57
End: 2022-09-05

## 2022-10-17 ENCOUNTER — APPOINTMENT (OUTPATIENT)
Dept: FAMILY MEDICINE | Facility: CLINIC | Age: 57
End: 2022-10-17

## 2022-10-17 VITALS
WEIGHT: 135 LBS | SYSTOLIC BLOOD PRESSURE: 140 MMHG | DIASTOLIC BLOOD PRESSURE: 94 MMHG | HEIGHT: 65 IN | RESPIRATION RATE: 14 BRPM | OXYGEN SATURATION: 99 % | HEART RATE: 94 BPM | BODY MASS INDEX: 22.49 KG/M2

## 2022-10-17 VITALS — TEMPERATURE: 97 F

## 2022-10-17 DIAGNOSIS — R30.0 DYSURIA: ICD-10-CM

## 2022-10-17 DIAGNOSIS — R10.2 PELVIC AND PERINEAL PAIN: ICD-10-CM

## 2022-10-17 PROCEDURE — 99213 OFFICE O/P EST LOW 20 MIN: CPT | Mod: 25

## 2022-10-17 PROCEDURE — 81003 URINALYSIS AUTO W/O SCOPE: CPT | Mod: QW

## 2022-10-17 NOTE — REVIEW OF SYSTEMS
[Dysuria] : no dysuria [Incontinence] : no incontinence [Nocturia] : no nocturia [Poor Libido] : libido not poor [Hematuria] : no hematuria [Frequency] : frequency [Vaginal Discharge] : no vaginal discharge [Dysmenorrhea] : no dysmenorrhea [Negative] : Heme/Lymph [FreeTextEntry8] : bladder discomfort

## 2022-10-17 NOTE — HISTORY OF PRESENT ILLNESS
[FreeTextEntry8] : Patient c/o of UTI symptoms for 2 days. Patient states she's been having abdominal cramps and back pain. Denies fever, chills, hematuria.

## 2022-10-17 NOTE — PHYSICAL EXAM
[No Acute Distress] : no acute distress [Normal Sclera/Conjunctiva] : normal sclera/conjunctiva [Normal Outer Ear/Nose] : the outer ears and nose were normal in appearance [No JVD] : no jugular venous distention [No Respiratory Distress] : no respiratory distress  [No Accessory Muscle Use] : no accessory muscle use [Clear to Auscultation] : lungs were clear to auscultation bilaterally [Normal Rate] : normal rate  [Regular Rhythm] : with a regular rhythm [Normal S1, S2] : normal S1 and S2 [No Murmur] : no murmur heard [No Extremity Clubbing/Cyanosis] : no extremity clubbing/cyanosis [Soft] : abdomen soft [Non Tender] : non-tender [No HSM] : no HSM [No CVA Tenderness] : no CVA  tenderness [Normal Gait] : normal gait [Normal Affect] : the affect was normal

## 2022-10-17 NOTE — PLAN
[FreeTextEntry1] : Acute UTI: \par in office urine with presence of leukocytes \par will start patient on Macrobid as directed \par advised to stay well hydrated \par may take Tylenol and or Motrin for relief \par may use AZO as directed \par will send urine for cx \par \par RTO as routine for follow up.\par

## 2022-10-19 LAB — BACTERIA UR CULT: NORMAL

## 2022-10-23 ENCOUNTER — RX RENEWAL (OUTPATIENT)
Age: 57
End: 2022-10-23

## 2022-11-01 LAB
T4 FREE SERPL-MCNC: 1 NG/DL
TSH SERPL-ACNC: 1.72 UIU/ML

## 2022-11-03 ENCOUNTER — APPOINTMENT (OUTPATIENT)
Dept: SURGERY | Facility: CLINIC | Age: 57
End: 2022-11-03

## 2022-11-03 ENCOUNTER — APPOINTMENT (OUTPATIENT)
Dept: ENDOCRINOLOGY | Facility: CLINIC | Age: 57
End: 2022-11-03

## 2022-11-03 VITALS
HEART RATE: 79 BPM | DIASTOLIC BLOOD PRESSURE: 80 MMHG | SYSTOLIC BLOOD PRESSURE: 122 MMHG | WEIGHT: 137 LBS | BODY MASS INDEX: 22.82 KG/M2 | HEIGHT: 65 IN

## 2022-11-03 PROCEDURE — 99214 OFFICE O/P EST MOD 30 MIN: CPT

## 2022-11-03 RX ORDER — COVID-19 MOLECULAR TEST ASSAY
KIT MISCELLANEOUS
Qty: 8 | Refills: 0 | Status: COMPLETED | COMMUNITY
Start: 2022-10-05

## 2022-11-03 RX ORDER — AZELASTINE HYDROCHLORIDE 205.5 UG/1
0.15 SPRAY, METERED NASAL
Qty: 1 | Refills: 6 | Status: DISCONTINUED | COMMUNITY
Start: 2018-05-22 | End: 2022-11-03

## 2022-11-03 RX ORDER — NITROFURANTOIN (MONOHYDRATE/MACROCRYSTALS) 25; 75 MG/1; MG/1
100 CAPSULE ORAL
Qty: 10 | Refills: 0 | Status: DISCONTINUED | COMMUNITY
Start: 2022-10-17 | End: 2022-11-03

## 2022-11-03 RX ORDER — FLUTICASONE PROPIONATE 50 UG/1
50 SPRAY, METERED NASAL
Qty: 16 | Refills: 3 | Status: DISCONTINUED | COMMUNITY
Start: 2018-05-30 | End: 2022-11-03

## 2022-11-09 ENCOUNTER — OFFICE (OUTPATIENT)
Dept: URBAN - METROPOLITAN AREA CLINIC 100 | Facility: CLINIC | Age: 57
Setting detail: OPHTHALMOLOGY
End: 2022-11-09
Payer: COMMERCIAL

## 2022-11-09 DIAGNOSIS — H43.393: ICD-10-CM

## 2022-11-09 DIAGNOSIS — H25.13: ICD-10-CM

## 2022-11-09 DIAGNOSIS — M35.9: ICD-10-CM

## 2022-11-09 DIAGNOSIS — Z79.899: ICD-10-CM

## 2022-11-09 PROBLEM — H52.13 S/P LASIK; BOTH EYES: Status: ACTIVE | Noted: 2022-11-09

## 2022-11-09 PROCEDURE — 99214 OFFICE O/P EST MOD 30 MIN: CPT | Performed by: OPHTHALMOLOGY

## 2022-11-09 PROCEDURE — 92083 EXTENDED VISUAL FIELD XM: CPT | Performed by: OPHTHALMOLOGY

## 2022-11-09 ASSESSMENT — REFRACTION_AUTOREFRACTION
OS_SPHERE: +0.75
OD_AXIS: 177
OS_AXIS: 173
OD_CYLINDER: -0.75
OS_CYLINDER: -2.00
OD_SPHERE: +0.75

## 2022-11-09 ASSESSMENT — AXIALLENGTH_DERIVED
OS_AL: 24.5038
OD_AL: 24.3501
OD_AL: 24.2468
OS_AL: 24.5038
OD_AL: 24.1955
OS_AL: 24.4513

## 2022-11-09 ASSESSMENT — SPHEQUIV_DERIVED
OD_SPHEQUIV: 0
OS_SPHEQUIV: -0.25
OD_SPHEQUIV: 0.25
OS_SPHEQUIV: -0.25
OS_SPHEQUIV: -0.125
OD_SPHEQUIV: 0.375

## 2022-11-09 ASSESSMENT — REFRACTION_MANIFEST
OD_AXIS: 175
OD_VA1: 20/30-1
OD_SPHERE: +0.50
OD_CYLINDER: -0.50
OS_CYLINDER: -1.00
OD_CYLINDER: -1.00
OD_ADD: +2.25
OD_AXIS: 170
OS_VA1: 20/30-2
OS_SPHERE: +0.25
OS_SPHERE: +0.75
OS_CYLINDER: -1.75
OS_ADD: +2.25
OS_AXIS: 180
OS_VA1: 20/40-1
OD_VA1: 20/30
OS_AXIS: 180
OD_SPHERE: +0.50

## 2022-11-09 ASSESSMENT — REFRACTION_CURRENTRX
OS_VPRISM_DIRECTION: SV
OS_OVR_VA: 20/
OS_CYLINDER: -1.75
OD_CYLINDER: -1.50
OS_AXIS: 175
OD_VPRISM_DIRECTION: SV
OS_OVR_VA: 20/
OS_ADD: +2.75
OD_OVR_VA: 20/
OD_ADD: +2.75
OD_VPRISM_DIRECTION: SV
OS_SPHERE: +0.75
OS_VPRISM_DIRECTION: SV
OD_SPHERE: +0.50
OD_OVR_VA: 20/
OD_AXIS: 170

## 2022-11-09 ASSESSMENT — KERATOMETRY
OD_AXISANGLE_DEGREES: 169
OS_K1POWER_DIOPTERS: 40.25
OD_K2POWER_DIOPTERS: 42.25
OS_AXISANGLE_DEGREES: 170
OS_K2POWER_DIOPTERS: 42.50
OD_K1POWER_DIOPTERS: 40.75

## 2022-11-09 ASSESSMENT — VISUAL ACUITY
OD_BCVA: 20/40
OS_BCVA: 20/30-2

## 2022-11-09 ASSESSMENT — TONOMETRY
OD_IOP_MMHG: 12
OS_IOP_MMHG: 12

## 2022-12-15 DIAGNOSIS — Z20.828 CONTACT WITH AND (SUSPECTED) EXPOSURE TO OTHER VIRAL COMMUNICABLE DISEASES: ICD-10-CM

## 2023-01-27 ENCOUNTER — APPOINTMENT (OUTPATIENT)
Dept: FAMILY MEDICINE | Facility: CLINIC | Age: 58
End: 2023-01-27

## 2023-02-12 ENCOUNTER — RX RENEWAL (OUTPATIENT)
Age: 58
End: 2023-02-12

## 2023-02-13 ENCOUNTER — TRANSCRIPTION ENCOUNTER (OUTPATIENT)
Age: 58
End: 2023-02-13

## 2023-02-13 ENCOUNTER — APPOINTMENT (OUTPATIENT)
Dept: ENDOCRINOLOGY | Facility: CLINIC | Age: 58
End: 2023-02-13

## 2023-02-20 NOTE — ASU DISCHARGE PLAN (ADULT/PEDIATRIC) - DATE OF FIRST COVID-19 BOOSTER
15-Dec-2021 Otezla Counseling: The side effects of Otezla were discussed with the patient, including but not limited to worsening or new depression, weight loss, diarrhea, nausea, upper respiratory tract infection, and headache. Patient instructed to call the office should any adverse effect occur.  The patient verbalized understanding of the proper use and possible adverse effects of Otezla.  All the patient's questions and concerns were addressed.

## 2023-02-27 ENCOUNTER — APPOINTMENT (OUTPATIENT)
Dept: FAMILY MEDICINE | Facility: CLINIC | Age: 58
End: 2023-02-27
Payer: COMMERCIAL

## 2023-02-27 VITALS
DIASTOLIC BLOOD PRESSURE: 96 MMHG | HEART RATE: 91 BPM | WEIGHT: 137 LBS | TEMPERATURE: 97 F | SYSTOLIC BLOOD PRESSURE: 142 MMHG | RESPIRATION RATE: 14 BRPM | OXYGEN SATURATION: 98 % | BODY MASS INDEX: 22.82 KG/M2 | HEIGHT: 65 IN

## 2023-02-27 DIAGNOSIS — J01.90 ACUTE SINUSITIS, UNSPECIFIED: ICD-10-CM

## 2023-02-27 DIAGNOSIS — B96.89 ACUTE SINUSITIS, UNSPECIFIED: ICD-10-CM

## 2023-02-27 PROCEDURE — 99213 OFFICE O/P EST LOW 20 MIN: CPT

## 2023-02-27 RX ORDER — OSELTAMIVIR PHOSPHATE 75 MG/1
75 CAPSULE ORAL
Qty: 1 | Refills: 0 | Status: COMPLETED | COMMUNITY
Start: 2022-12-15 | End: 2023-02-27

## 2023-02-27 NOTE — PHYSICAL EXAM
[Normal] : normal rate, regular rhythm, normal S1 and S2 and no murmur heard [de-identified] : sinus pressure  right occipital gland or muscle pain

## 2023-02-27 NOTE — ASSESSMENT
[FreeTextEntry1] : check swab\par Take antibiotics,  rest,  increase fluids, wash hands to prevent the spread of  infection . Take mucinex   over the counter. Take tylenol or advil for fever or body aches. Follow up if no better or worse respiratory symptoms.\par

## 2023-02-27 NOTE — HISTORY OF PRESENT ILLNESS
[FreeTextEntry8] : patient presents with sinus pressure, headache and fatigue, PND. negative at home Covid test  when i bend down i feel it in my top of head and face pressure sinus ? draining since last tuesday it is triggering migraine

## 2023-02-28 ENCOUNTER — RX RENEWAL (OUTPATIENT)
Age: 58
End: 2023-02-28

## 2023-02-28 LAB
RAPID RVP RESULT: NOT DETECTED
SARS-COV-2 RNA PNL RESP NAA+PROBE: NOT DETECTED

## 2023-03-12 ENCOUNTER — RX RENEWAL (OUTPATIENT)
Age: 58
End: 2023-03-12

## 2023-04-03 ENCOUNTER — RX CHANGE (OUTPATIENT)
Age: 58
End: 2023-04-03

## 2023-04-03 RX ORDER — PAROXETINE HYDROCHLORIDE 40 MG/1
40 TABLET, FILM COATED ORAL DAILY
Qty: 30 | Refills: 0 | Status: DISCONTINUED | COMMUNITY
Start: 2020-06-22 | End: 2023-04-03

## 2023-04-04 ENCOUNTER — NON-APPOINTMENT (OUTPATIENT)
Age: 58
End: 2023-04-04

## 2023-04-05 ENCOUNTER — NON-APPOINTMENT (OUTPATIENT)
Age: 58
End: 2023-04-05

## 2023-04-06 ENCOUNTER — NON-APPOINTMENT (OUTPATIENT)
Age: 58
End: 2023-04-06

## 2023-04-08 ENCOUNTER — TRANSCRIPTION ENCOUNTER (OUTPATIENT)
Age: 58
End: 2023-04-08

## 2023-04-08 LAB
25(OH)D3 SERPL-MCNC: 34.9 NG/ML
ALBUMIN SERPL ELPH-MCNC: 4.7 G/DL
ALP BLD-CCNC: 81 U/L
ALT SERPL-CCNC: 15 U/L
ANION GAP SERPL CALC-SCNC: 15 MMOL/L
AST SERPL-CCNC: 19 U/L
BILIRUB SERPL-MCNC: 0.3 MG/DL
BUN SERPL-MCNC: 12 MG/DL
CALCIUM SERPL-MCNC: 9.5 MG/DL
CHLORIDE SERPL-SCNC: 99 MMOL/L
CHOLEST SERPL-MCNC: 260 MG/DL
CO2 SERPL-SCNC: 25 MMOL/L
CREAT SERPL-MCNC: 0.73 MG/DL
EGFR: 96 ML/MIN/1.73M2
GLUCOSE SERPL-MCNC: 105 MG/DL
HDLC SERPL-MCNC: 106 MG/DL
LDLC SERPL CALC-MCNC: 140 MG/DL
NONHDLC SERPL-MCNC: 154 MG/DL
POTASSIUM SERPL-SCNC: 4.9 MMOL/L
PROT SERPL-MCNC: 6.8 G/DL
SODIUM SERPL-SCNC: 138 MMOL/L
T4 FREE SERPL-MCNC: 1.1 NG/DL
TRIGL SERPL-MCNC: 72 MG/DL
TSH SERPL-ACNC: 2.32 UIU/ML

## 2023-05-18 ENCOUNTER — APPOINTMENT (OUTPATIENT)
Dept: ENDOCRINOLOGY | Facility: CLINIC | Age: 58
End: 2023-05-18
Payer: COMMERCIAL

## 2023-05-18 VITALS
OXYGEN SATURATION: 99 % | WEIGHT: 138 LBS | BODY MASS INDEX: 22.99 KG/M2 | SYSTOLIC BLOOD PRESSURE: 120 MMHG | HEART RATE: 94 BPM | DIASTOLIC BLOOD PRESSURE: 82 MMHG | HEIGHT: 65 IN

## 2023-05-18 DIAGNOSIS — E78.5 HYPERLIPIDEMIA, UNSPECIFIED: ICD-10-CM

## 2023-05-18 DIAGNOSIS — E06.3 AUTOIMMUNE THYROIDITIS: ICD-10-CM

## 2023-05-18 DIAGNOSIS — E03.9 HYPOTHYROIDISM, UNSPECIFIED: ICD-10-CM

## 2023-05-18 DIAGNOSIS — E55.9 VITAMIN D DEFICIENCY, UNSPECIFIED: ICD-10-CM

## 2023-05-18 DIAGNOSIS — R53.83 OTHER FATIGUE: ICD-10-CM

## 2023-05-18 PROCEDURE — 99214 OFFICE O/P EST MOD 30 MIN: CPT

## 2023-06-05 ENCOUNTER — OFFICE (OUTPATIENT)
Dept: URBAN - METROPOLITAN AREA CLINIC 100 | Facility: CLINIC | Age: 58
Setting detail: OPHTHALMOLOGY
End: 2023-06-05
Payer: COMMERCIAL

## 2023-06-05 DIAGNOSIS — H25.13: ICD-10-CM

## 2023-06-05 DIAGNOSIS — M35.9: ICD-10-CM

## 2023-06-05 DIAGNOSIS — Z79.899: ICD-10-CM

## 2023-06-05 DIAGNOSIS — H43.393: ICD-10-CM

## 2023-06-05 PROCEDURE — 92014 COMPRE OPH EXAM EST PT 1/>: CPT | Performed by: OPHTHALMOLOGY

## 2023-06-05 PROCEDURE — 92134 CPTRZ OPH DX IMG PST SGM RTA: CPT | Performed by: OPHTHALMOLOGY

## 2023-06-05 ASSESSMENT — REFRACTION_AUTOREFRACTION
OS_AXIS: 174
OD_CYLINDER: -1.00
OD_SPHERE: +0.75
OD_AXIS: 166
OS_CYLINDER: -1.50
OS_SPHERE: +0.50

## 2023-06-05 ASSESSMENT — REFRACTION_MANIFEST
OS_CYLINDER: -1.00
OS_AXIS: 180
OD_CYLINDER: -1.00
OS_SPHERE: +0.25
OD_AXIS: 165
OS_CYLINDER: -2.00
OD_VA1: 20/30-1
OS_SPHERE: +0.50
OS_AXIS: 175
OD_SPHERE: +0.75
OD_VA1: 20/25-2
OS_ADD: +2.25
OD_CYLINDER: -0.50
OS_VA1: 20/30-2
OD_SPHERE: +0.50
OS_VA1: 20/40-
OD_AXIS: 175
OD_ADD: +2.25

## 2023-06-05 ASSESSMENT — AXIALLENGTH_DERIVED
OD_AL: 24.5911
OS_AL: 24.6032
OD_AL: 24.5911
OS_AL: 24.7096
OD_AL: 24.5911
OS_AL: 24.6032

## 2023-06-05 ASSESSMENT — REFRACTION_CURRENTRX
OS_CYLINDER: -2.25
OS_VPRISM_DIRECTION: SV
OS_OVR_VA: 20/
OD_VPRISM_DIRECTION: SV
OD_AXIS: 170
OS_SPHERE: +0.75
OS_AXIS: 175
OD_OVR_VA: 20/
OS_ADD: +2.75
OD_ADD: +2.75
OS_OVR_VA: 20/
OD_CYLINDER: -1.50
OD_OVR_VA: 20/
OS_VPRISM_DIRECTION: SV
OD_SPHERE: +1.00
OD_VPRISM_DIRECTION: SV

## 2023-06-05 ASSESSMENT — KERATOMETRY
OS_AXISANGLE_DEGREES: 082
OD_K1POWER_DIOPTERS: 40.25
OS_K1POWER_DIOPTERS: 40.25
OD_AXISANGLE_DEGREES: 074
OS_K2POWER_DIOPTERS: 42
OD_K2POWER_DIOPTERS: 41.00

## 2023-06-05 ASSESSMENT — SPHEQUIV_DERIVED
OD_SPHEQUIV: 0.25
OS_SPHEQUIV: -0.25
OS_SPHEQUIV: -0.25
OS_SPHEQUIV: -0.5
OD_SPHEQUIV: 0.25
OD_SPHEQUIV: 0.25

## 2023-06-05 ASSESSMENT — TONOMETRY
OS_IOP_MMHG: 13
OD_IOP_MMHG: 13

## 2023-06-05 ASSESSMENT — VISUAL ACUITY
OD_BCVA: 20/30-2
OS_BCVA: 20/40-2

## 2023-06-05 ASSESSMENT — CONFRONTATIONAL VISUAL FIELD TEST (CVF)
OD_FINDINGS: FULL
OS_FINDINGS: FULL

## 2023-08-11 ENCOUNTER — RX RENEWAL (OUTPATIENT)
Age: 58
End: 2023-08-11

## 2023-09-01 ENCOUNTER — APPOINTMENT (OUTPATIENT)
Dept: FAMILY MEDICINE | Facility: CLINIC | Age: 58
End: 2023-09-01
Payer: COMMERCIAL

## 2023-09-01 VITALS
HEART RATE: 94 BPM | WEIGHT: 139 LBS | BODY MASS INDEX: 23.16 KG/M2 | OXYGEN SATURATION: 98 % | HEIGHT: 65 IN | DIASTOLIC BLOOD PRESSURE: 82 MMHG | TEMPERATURE: 98 F | SYSTOLIC BLOOD PRESSURE: 124 MMHG

## 2023-09-01 DIAGNOSIS — F40.243 FEAR OF FLYING: ICD-10-CM

## 2023-09-01 DIAGNOSIS — R93.3 ABNORMAL FINDINGS ON DIAGNOSTIC IMAGING OF OTHER PARTS OF DIGESTIVE TRACT: ICD-10-CM

## 2023-09-01 DIAGNOSIS — Z00.00 ENCOUNTER FOR GENERAL ADULT MEDICAL EXAMINATION W/OUT ABNORMAL FINDINGS: ICD-10-CM

## 2023-09-01 DIAGNOSIS — K29.71 GASTRITIS, UNSPECIFIED, WITH BLEEDING: ICD-10-CM

## 2023-09-01 DIAGNOSIS — F41.9 ANXIETY DISORDER, UNSPECIFIED: ICD-10-CM

## 2023-09-01 PROCEDURE — 99396 PREV VISIT EST AGE 40-64: CPT | Mod: 25

## 2023-09-01 PROCEDURE — G0444 DEPRESSION SCREEN ANNUAL: CPT | Mod: 59

## 2023-09-01 RX ORDER — HYDROXYCHLOROQUINE SULFATE 200 MG/1
200 TABLET, FILM COATED ORAL EVERY OTHER DAY
Qty: 15 | Refills: 0 | Status: ACTIVE | COMMUNITY
Start: 2020-08-24

## 2023-09-01 RX ORDER — RIZATRIPTAN BENZOATE 10 MG/1
10 TABLET, ORALLY DISINTEGRATING ORAL
Qty: 40 | Refills: 1 | Status: DISCONTINUED | COMMUNITY
Start: 2018-09-11 | End: 2023-09-01

## 2023-09-01 RX ORDER — DOXYCYCLINE HYCLATE 100 MG/1
100 TABLET ORAL
Qty: 20 | Refills: 3 | Status: DISCONTINUED | COMMUNITY
Start: 2023-02-27 | End: 2023-09-01

## 2023-09-01 RX ORDER — LEVOTHYROXINE SODIUM 25 UG/1
25 TABLET ORAL
Qty: 90 | Refills: 0 | Status: ACTIVE | COMMUNITY
Start: 2023-09-01

## 2023-09-01 RX ORDER — ALPRAZOLAM 0.5 MG/1
0.5 TABLET ORAL DAILY
Qty: 30 | Refills: 0 | Status: ACTIVE | COMMUNITY
Start: 2018-05-22 | End: 1900-01-01

## 2023-09-01 RX ORDER — PAROXETINE HYDROCHLORIDE 10 MG/1
10 TABLET, FILM COATED ORAL
Qty: 90 | Refills: 0 | Status: DISCONTINUED | COMMUNITY
Start: 2022-03-10 | End: 2023-09-01

## 2023-09-01 RX ORDER — ROSUVASTATIN CALCIUM 5 MG/1
5 TABLET, FILM COATED ORAL
Qty: 90 | Refills: 1 | Status: DISCONTINUED | COMMUNITY
Start: 2023-05-18 | End: 2023-09-01

## 2023-09-01 RX ORDER — FLUCONAZOLE 150 MG/1
150 TABLET ORAL
Qty: 2 | Refills: 2 | Status: DISCONTINUED | COMMUNITY
Start: 2023-02-27 | End: 2023-09-01

## 2023-09-01 NOTE — REVIEW OF SYSTEMS
[Skin Rash] : skin rash [Headache] : headache [Negative] : Heme/Lymph [Fever] : no fever [Chills] : no chills [Chest Pain] : no chest pain [Palpitations] : no palpitations [Shortness Of Breath] : no shortness of breath [Cough] : no cough [Abdominal Pain] : no abdominal pain [Nausea] : no nausea [Constipation] : no constipation [Diarrhea] : diarrhea [Vomiting] : no vomiting [Melena] : no melena [Dysuria] : no dysuria [Hematuria] : no hematuria [Frequency] : no frequency [Itching] : no itching [Mole Changes] : no mole changes [Dizziness] : no dizziness [Fainting] : no fainting [Anxiety] : no anxiety [Depression] : no depression [de-identified] : +squamous cell on left thigh  [de-identified] : denies headaches waking her from sleep, typical migraine nothing new

## 2023-09-01 NOTE — PHYSICAL EXAM
[No Lymphadenopathy] : no lymphadenopathy [Supple] : supple [No Edema] : there was no peripheral edema [Declined Breast Exam] : declined breast exam  [Normal] : soft, non-tender, non-distended, no masses palpated, no HSM and normal bowel sounds [Normal Posterior Cervical Nodes] : no posterior cervical lymphadenopathy [Normal Anterior Cervical Nodes] : no anterior cervical lymphadenopathy [No CVA Tenderness] : no CVA  tenderness [No Rash] : no rash [No Focal Deficits] : no focal deficits [Normal Affect] : the affect was normal [Normal Mood] : the mood was normal [Normal Insight/Judgement] : insight and judgment were intact [de-identified] : thyroid feels enlarged [de-identified] : no calf tenderness b/l LE [de-identified] :  no guarding or rigidity [de-identified] : CN 2-12 INTACT, NORMAL STRENGTH UPPER AND LOWER EXT B/L 5/5, NORMAL RAPID ALTERNATING MOVEMENTS AND FINGER TO NOSE

## 2023-09-01 NOTE — PLAN
[FreeTextEntry1] :  CPE -Labs labs to be done at lab   -Mammogram due oct has script , declined breast exam  -Colonoscopy Screening up to date 7/2023 due 2028  -Advised annual ophthalmology, dental and dermatology visits -Advised monthly breast exams -Annual depression screening - negative   hx HLD did not take statin will recheck lipid and determine if still needed  HTN borderline, pt wanted to stop meds but i do not think that is a good idea at this time as her bp is borderline avoid salt continue to monitor at home which seems to be controlled at home, we checked her bp monitor in the past and it was accurate continue losartan 25mg po daily  anxiety w/ flying  refill xanax 0.5mg po daily prn istopped Reference #: [id number]  anxiety stable but wants to decrease paroxetine from 50mg to 40mg will monitor on this new dose  migraines, off triptan due to issues with  acute gastritis on endoscopy advised to see neurologist as she is having them infrequently so she would not need meds for prevention or daily meds cannot take nsaids due to  acute gastritis on endoscopy  f/u if no relief or if issues arise pt agreed w/plan

## 2023-09-01 NOTE — HEALTH RISK ASSESSMENT
[No falls in past year] : Patient reported no falls in the past year [0] : 2) Feeling down, depressed, or hopeless: Not at all (0) [PHQ-2 Negative - No further assessment needed] : PHQ-2 Negative - No further assessment needed [I have developed a follow-up plan documented below in the note.] : I have developed a follow-up plan documented below in the note. [Fully functional (bathing, dressing, toileting, transferring, walking, feeding)] : Fully functional (bathing, dressing, toileting, transferring, walking, feeding) [Fully functional (using the telephone, shopping, preparing meals, housekeeping, doing laundry, using] : Fully functional and needs no help or supervision to perform IADLs (using the telephone, shopping, preparing meals, housekeeping, doing laundry, using transportation, managing medications and managing finances) [Smoke Detector] : smoke detector [Carbon Monoxide Detector] : carbon monoxide detector [Seat Belt] :  uses seat belt [Sunscreen] : uses sunscreen [[id number]] : 0

## 2023-09-01 NOTE — HISTORY OF PRESENT ILLNESS
[FreeTextEntry1] : Patient presents for annual physical. Patient saw cardiologist in May and had EKG done there. [de-identified] : 56yo F presents for cpe.  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

## 2023-10-11 ENCOUNTER — APPOINTMENT (OUTPATIENT)
Dept: ENDOCRINOLOGY | Facility: CLINIC | Age: 58
End: 2023-10-11

## 2023-10-31 ENCOUNTER — OFFICE (OUTPATIENT)
Dept: URBAN - METROPOLITAN AREA CLINIC 113 | Facility: CLINIC | Age: 58
Setting detail: OPHTHALMOLOGY
End: 2023-10-31
Payer: COMMERCIAL

## 2023-10-31 DIAGNOSIS — H43.393: ICD-10-CM

## 2023-10-31 DIAGNOSIS — H25.13: ICD-10-CM

## 2023-10-31 PROCEDURE — 99214 OFFICE O/P EST MOD 30 MIN: CPT | Performed by: STUDENT IN AN ORGANIZED HEALTH CARE EDUCATION/TRAINING PROGRAM

## 2023-10-31 ASSESSMENT — REFRACTION_MANIFEST
OS_SPHERE: +0.25
OS_AXIS: 180
OD_SPHERE: +0.50
OS_SPHERE: +0.50
OS_CYLINDER: -2.00
OD_AXIS: 175
OD_VA1: 20/30-1
OS_AXIS: 175
OD_CYLINDER: -1.00
OD_SPHERE: +0.75
OS_CYLINDER: -1.00
OD_AXIS: 165
OS_VA1: 20/30-2
OD_VA1: 20/25-2
OS_VA1: 20/40-
OS_ADD: +2.25
OD_ADD: +2.25
OD_CYLINDER: -0.50

## 2023-10-31 ASSESSMENT — REFRACTION_AUTOREFRACTION
OD_AXIS: 163
OD_SPHERE: +0.75
OD_CYLINDER: -1.00
OS_AXIS: 171
OS_SPHERE: +0.50
OS_CYLINDER: -1.75

## 2023-10-31 ASSESSMENT — VISUAL ACUITY
OS_BCVA: 20/40-2
OD_BCVA: 20/30-2

## 2023-10-31 ASSESSMENT — SPHEQUIV_DERIVED
OS_SPHEQUIV: -0.375
OS_SPHEQUIV: -0.25
OD_SPHEQUIV: 0.25
OD_SPHEQUIV: 0.25
OS_SPHEQUIV: -0.5
OD_SPHEQUIV: 0.25

## 2023-10-31 ASSESSMENT — REFRACTION_CURRENTRX
OD_CYLINDER: -1.50
OS_SPHERE: +0.75
OD_AXIS: 172
OS_SPHERE: +0.75
OS_VPRISM_DIRECTION: SV
OD_OVR_VA: 20/
OD_AXIS: 170
OS_CYLINDER: -2.00
OS_VPRISM_DIRECTION: SV
OD_SPHERE: +1.00
OS_AXIS: 175
OD_SPHERE: +1.00
OD_CYLINDER: -1.50
OD_VPRISM_DIRECTION: SV
OD_OVR_VA: 20/
OS_OVR_VA: 20/
OS_CYLINDER: -2.25
OS_AXIS: 175
OD_VPRISM_DIRECTION: SV
OS_OVR_VA: 20/

## 2023-10-31 ASSESSMENT — TONOMETRY
OD_IOP_MMHG: 14
OS_IOP_MMHG: 13

## 2023-10-31 ASSESSMENT — CONFRONTATIONAL VISUAL FIELD TEST (CVF)
OS_FINDINGS: FULL
OD_FINDINGS: FULL

## 2024-01-22 ENCOUNTER — RX RENEWAL (OUTPATIENT)
Age: 59
End: 2024-01-22

## 2024-01-22 RX ORDER — LEVOTHYROXINE SODIUM 0.03 MG/1
25 TABLET ORAL
Qty: 90 | Refills: 3 | Status: ACTIVE | COMMUNITY
Start: 2022-07-22 | End: 1900-01-01

## 2024-02-19 ENCOUNTER — OFFICE (OUTPATIENT)
Dept: URBAN - METROPOLITAN AREA CLINIC 100 | Facility: CLINIC | Age: 59
Setting detail: OPHTHALMOLOGY
End: 2024-02-19
Payer: COMMERCIAL

## 2024-02-19 DIAGNOSIS — H25.13: ICD-10-CM

## 2024-02-19 DIAGNOSIS — M35.9: ICD-10-CM

## 2024-02-19 DIAGNOSIS — H43.393: ICD-10-CM

## 2024-02-19 DIAGNOSIS — Z79.899: ICD-10-CM

## 2024-02-19 PROCEDURE — 92014 COMPRE OPH EXAM EST PT 1/>: CPT | Performed by: OPHTHALMOLOGY

## 2024-02-19 PROCEDURE — 92134 CPTRZ OPH DX IMG PST SGM RTA: CPT | Performed by: OPHTHALMOLOGY

## 2024-02-19 PROCEDURE — 92083 EXTENDED VISUAL FIELD XM: CPT | Performed by: OPHTHALMOLOGY

## 2024-02-19 ASSESSMENT — REFRACTION_MANIFEST
OS_AXIS: 180
OD_AXIS: 175
OD_CYLINDER: -0.50
OS_SPHERE: +0.25
OS_CYLINDER: -1.00
OD_AXIS: 165
OD_SPHERE: +0.50
OD_CYLINDER: -1.00
OD_ADD: +2.25
OS_SPHERE: +0.25
OS_AXIS: 005
OS_VA1: 20/30-2
OD_VA1: 20/30-1
OS_VA1: 20/30+2
OD_SPHERE: +0.75
OS_ADD: +2.25
OD_VA1: 20/25-2
OS_CYLINDER: -2.00

## 2024-02-19 ASSESSMENT — REFRACTION_CURRENTRX
OD_OVR_VA: 20/
OS_SPHERE: +0.75
OD_AXIS: 170
OD_AXIS: 164
OD_VPRISM_DIRECTION: SV
OS_OVR_VA: 20/
OD_CYLINDER: -1.50
OS_OVR_VA: 20/
OS_CYLINDER: -2.00
OD_OVR_VA: 20/
OD_CYLINDER: -1.50
OS_AXIS: 175
OS_VPRISM_DIRECTION: SV
OS_CYLINDER: -2.25
OD_SPHERE: +1.00
OS_VPRISM_DIRECTION: SV
OS_AXIS: 175
OD_SPHERE: +1.00
OS_SPHERE: +0.75
OD_VPRISM_DIRECTION: SV

## 2024-02-19 ASSESSMENT — SPHEQUIV_DERIVED
OD_SPHEQUIV: 0.25
OS_SPHEQUIV: -0.75
OD_SPHEQUIV: 0.25
OS_SPHEQUIV: -0.75
OD_SPHEQUIV: 0.5
OS_SPHEQUIV: -0.25

## 2024-02-19 ASSESSMENT — REFRACTION_AUTOREFRACTION
OD_AXIS: 164
OS_SPHERE: +0.25
OS_CYLINDER: -2.00
OD_SPHERE: +1.00
OS_AXIS: 003
OD_CYLINDER: -1.00

## 2024-02-19 ASSESSMENT — CONFRONTATIONAL VISUAL FIELD TEST (CVF)
OS_FINDINGS: FULL
OD_FINDINGS: FULL

## 2024-02-21 DIAGNOSIS — T75.3XXA MOTION SICKNESS, INITIAL ENCOUNTER: ICD-10-CM

## 2024-02-21 RX ORDER — EPINEPHRINE 0.3 MG/.3ML
0.3 INJECTION INTRAMUSCULAR
Qty: 2 | Refills: 2 | Status: ACTIVE | COMMUNITY
Start: 2020-09-23 | End: 1900-01-01

## 2024-03-01 ENCOUNTER — RX RENEWAL (OUTPATIENT)
Age: 59
End: 2024-03-01

## 2024-03-12 ENCOUNTER — LABORATORY RESULT (OUTPATIENT)
Age: 59
End: 2024-03-12

## 2024-03-12 ENCOUNTER — NON-APPOINTMENT (OUTPATIENT)
Age: 59
End: 2024-03-12

## 2024-03-12 ENCOUNTER — APPOINTMENT (OUTPATIENT)
Dept: FAMILY MEDICINE | Facility: CLINIC | Age: 59
End: 2024-03-12
Payer: COMMERCIAL

## 2024-03-12 VITALS
HEART RATE: 97 BPM | SYSTOLIC BLOOD PRESSURE: 130 MMHG | RESPIRATION RATE: 14 BRPM | HEIGHT: 65 IN | OXYGEN SATURATION: 98 % | DIASTOLIC BLOOD PRESSURE: 80 MMHG | WEIGHT: 131 LBS | BODY MASS INDEX: 21.83 KG/M2 | TEMPERATURE: 98.5 F

## 2024-03-12 DIAGNOSIS — R39.9 UNSPECIFIED SYMPTOMS AND SIGNS INVOLVING THE GENITOURINARY SYSTEM: ICD-10-CM

## 2024-03-12 LAB
BILIRUB UR QL STRIP: NORMAL
CLARITY UR: CLEAR
COLLECTION METHOD: NORMAL
GLUCOSE UR-MCNC: NORMAL
HCG UR QL: 0.2 EU/DL
HGB UR QL STRIP.AUTO: NORMAL
KETONES UR-MCNC: NORMAL
LEUKOCYTE ESTERASE UR QL STRIP: NORMAL
NITRITE UR QL STRIP: NORMAL
PH UR STRIP: 6.5
PROT UR STRIP-MCNC: NORMAL
SP GR UR STRIP: 1.01

## 2024-03-12 PROCEDURE — 81003 URINALYSIS AUTO W/O SCOPE: CPT | Mod: QW

## 2024-03-12 PROCEDURE — 99214 OFFICE O/P EST MOD 30 MIN: CPT

## 2024-03-12 NOTE — HISTORY OF PRESENT ILLNESS
[FreeTextEntry8] : Patient presents for UTI. Symptoms started with abdominal cramping and urinary urgency for 1 week.  Has chronic back pain, but Back pain is worse than usual.  Denies fevers, chills, dysuria, or change in color of urine.   Can tolerate Cipro and Macrobid.

## 2024-03-12 NOTE — PHYSICAL EXAM
[No Acute Distress] : no acute distress [Well Nourished] : well nourished [Well Developed] : well developed [Well-Appearing] : well-appearing [Soft] : abdomen soft [Non Tender] : non-tender [Normal Bowel Sounds] : normal bowel sounds [No CVA Tenderness] : no CVA  tenderness [de-identified] : suprapubic TTP

## 2024-03-12 NOTE — REVIEW OF SYSTEMS
[Abdominal Pain] : abdominal pain [Frequency] : frequency [Muscle Pain] : muscle pain [Back Pain] : back pain [Negative] : Heme/Lymph

## 2024-03-14 LAB
APPEARANCE: CLEAR
BACTERIA UR CULT: NORMAL
BILIRUBIN URINE: NEGATIVE
BLOOD URINE: NEGATIVE
COLOR: YELLOW
GLUCOSE QUALITATIVE U: NEGATIVE MG/DL
KETONES URINE: NEGATIVE MG/DL
LEUKOCYTE ESTERASE URINE: ABNORMAL
NITRITE URINE: NEGATIVE
PH URINE: 6.5
PROTEIN URINE: NEGATIVE MG/DL
SPECIFIC GRAVITY URINE: 1.01
UROBILINOGEN URINE: 0.2 MG/DL

## 2024-04-01 ENCOUNTER — RX RENEWAL (OUTPATIENT)
Age: 59
End: 2024-04-01

## 2024-04-13 ENCOUNTER — RX RENEWAL (OUTPATIENT)
Age: 59
End: 2024-04-13

## 2024-04-13 RX ORDER — LOSARTAN POTASSIUM 25 MG/1
25 TABLET, FILM COATED ORAL DAILY
Qty: 90 | Refills: 0 | Status: ACTIVE | COMMUNITY
Start: 2022-03-24 | End: 1900-01-01

## 2024-05-16 ENCOUNTER — APPOINTMENT (OUTPATIENT)
Dept: NEUROLOGY | Facility: CLINIC | Age: 59
End: 2024-05-16
Payer: COMMERCIAL

## 2024-05-16 VITALS
BODY MASS INDEX: 21.66 KG/M2 | WEIGHT: 130 LBS | DIASTOLIC BLOOD PRESSURE: 86 MMHG | SYSTOLIC BLOOD PRESSURE: 131 MMHG | HEIGHT: 65 IN | HEART RATE: 88 BPM | OXYGEN SATURATION: 94 %

## 2024-05-16 DIAGNOSIS — G43.909 MIGRAINE, UNSPECIFIED, NOT INTRACTABLE, W/OUT STATUS MIGRAINOSUS: ICD-10-CM

## 2024-05-16 PROCEDURE — 99204 OFFICE O/P NEW MOD 45 MIN: CPT

## 2024-05-16 PROCEDURE — G2211 COMPLEX E/M VISIT ADD ON: CPT | Mod: NC,1L

## 2024-05-16 RX ORDER — PANTOPRAZOLE 40 MG/1
40 TABLET, DELAYED RELEASE ORAL
Refills: 0 | Status: DISCONTINUED | COMMUNITY
Start: 2023-09-01 | End: 2024-05-16

## 2024-05-16 RX ORDER — NITROFURANTOIN (MONOHYDRATE/MACROCRYSTALS) 25; 75 MG/1; MG/1
100 CAPSULE ORAL
Qty: 10 | Refills: 0 | Status: DISCONTINUED | COMMUNITY
Start: 2024-03-12 | End: 2024-05-16

## 2024-05-16 RX ORDER — FAMOTIDINE 40 MG/1
40 TABLET, FILM COATED ORAL
Refills: 0 | Status: ACTIVE | COMMUNITY

## 2024-05-16 RX ORDER — SCOPOLAMINE 1.5 MG/1
1 PATCH, EXTENDED RELEASE TRANSDERMAL
Qty: 1 | Refills: 0 | Status: DISCONTINUED | COMMUNITY
Start: 2024-02-21 | End: 2024-05-16

## 2024-05-16 RX ORDER — ATORVASTATIN CALCIUM 20 MG/1
20 TABLET, FILM COATED ORAL
Qty: 30 | Refills: 0 | Status: DISCONTINUED | COMMUNITY
Start: 2023-11-21 | End: 2024-05-16

## 2024-05-16 NOTE — HISTORY OF PRESENT ILLNESS
[FreeTextEntry1] : Ms. Meza is a 58-year-old female, former smoker, with history of HTN, HLD, thyroid nodule s/p left lobectomy 6/2022, Hashimoto's disease, undifferentiated connective tissue disorder who presents today to establish care for headaches. She reports history of migraine headaches since she was in her 30s. She was started on Fioricet by her PCP which initially helped but was then switched to rizatriptan about 15 years ago due to increased frequency/severity of her migraines with inadequate relief with Fioricet. Patient had an MRI at that time which was reportedly normal. Headaches are left sided, located behind her left eye, throbbing in nature. Her migraines used to be associated with her menstrual cycle. LMP was when she was 55 years old. She has noticed improvement of her headaches since menopause. Reports 1 migraine in the past month. Rizatriptan helps dull the pain but does not help completely get rid of it even with repetitive doses. She recently had an endoscopy which revealed esophageal bleeding. GI told her that it could be related to the rizatriptan and advised her to see neurology. Triggers include alcohol, flashing lights, perfume. Notes associated photophobia. She does not report visual aura prior to onset of her headache; however, 6 months ago she experienced an episode of colored flashing lights, lasting about 20 minutes. Denies associated headache with this episode. No additional neurological symptoms. Positive family history of migraines: sister.

## 2024-05-16 NOTE — PHYSICAL EXAM
[FreeTextEntry1] : GENERAL PHYSICAL EXAM: GEN: no distress, normal affect  NEUROLOGICAL EXAM: Mental Status Orientation: alert and oriented to person, place, time, and situation Language: clear and fluent, intact comprehension and repetition. No dysarthria.   Cranial Nerves II: visual fields full to confrontation III, IV, VI: PERRL, EOMI V, VII: facial sensation and movement intact and symmetric VIII: hearing intact IX, X: uvula midline, soft palate elevates normally XI: BL shoulder shrug intact XII: tongue midline   Motor Shoulder abduction: 5 (R), 5 (L) UE flexion: 5 (R), 5 (L) UE extension: 5 (R), 5 (L) Hand : 5 (R), 5 (L) Hip flexion: 5 (R), 5 (L) Knee flexion: 5 (R), 5 (L) Knee extension: 5 (R), 5 (L) Dorsiflexion: 5 (R), 5 (L) Plantar flexion: 5 (R), 5 (L)   Tone and bulk are normal in upper and lower limbs No pronator drift   Sensation Intact to light touch in all 4 EXTs    Coordination Normal FTN bilaterally Able to perform rapid, alternating movements   Gait Normal ambulation with no imbalance Tandem walk intact Negative Romberg

## 2024-05-16 NOTE — DISCUSSION/SUMMARY
[FreeTextEntry1] : Ms. Meza is a 58-year-old female with history of HTN, HLD, thyroid nodule s/p left lobectomy 6/2022, Hashimoto's disease, undifferentiated connective tissue disorder who presents today to establish care for migraine headaches. No focal neurological deficits on exam. Will plan to stop rizatriptan and start Ubrelvy 100 mg as needed for abortive therapy. Patient educated on safety precautions and side effects of medications. Patient reports decreased frequency/severity of her migraine headaches since menopause. Should she begin to experience change in quality or worsening of her headaches, will consider reimaging with MRI brain. Will follow up in 1 month. All of the patient's questions and concerns were addressed.
no

## 2024-05-17 RX ORDER — UBROGEPANT 100 MG/1
100 TABLET ORAL
Qty: 16 | Refills: 1 | Status: ACTIVE | COMMUNITY
Start: 2024-05-16 | End: 1900-01-01

## 2024-06-14 ENCOUNTER — APPOINTMENT (OUTPATIENT)
Dept: NEUROLOGY | Facility: CLINIC | Age: 59
End: 2024-06-14

## 2024-07-10 ENCOUNTER — NON-APPOINTMENT (OUTPATIENT)
Age: 59
End: 2024-07-10

## 2024-07-11 ENCOUNTER — RX RENEWAL (OUTPATIENT)
Age: 59
End: 2024-07-11

## 2024-07-16 ENCOUNTER — APPOINTMENT (OUTPATIENT)
Dept: NEUROLOGY | Facility: CLINIC | Age: 59
End: 2024-07-16
Payer: COMMERCIAL

## 2024-07-16 VITALS
HEART RATE: 96 BPM | OXYGEN SATURATION: 97 % | WEIGHT: 132 LBS | BODY MASS INDEX: 21.73 KG/M2 | SYSTOLIC BLOOD PRESSURE: 131 MMHG | HEIGHT: 65.5 IN | DIASTOLIC BLOOD PRESSURE: 89 MMHG

## 2024-07-16 DIAGNOSIS — G43.909 MIGRAINE, UNSPECIFIED, NOT INTRACTABLE, W/OUT STATUS MIGRAINOSUS: ICD-10-CM

## 2024-07-16 PROCEDURE — G2211 COMPLEX E/M VISIT ADD ON: CPT | Mod: NC

## 2024-07-16 PROCEDURE — 99214 OFFICE O/P EST MOD 30 MIN: CPT

## 2024-09-04 ENCOUNTER — APPOINTMENT (OUTPATIENT)
Dept: FAMILY MEDICINE | Facility: CLINIC | Age: 59
End: 2024-09-04
Payer: COMMERCIAL

## 2024-09-04 ENCOUNTER — RX RENEWAL (OUTPATIENT)
Age: 59
End: 2024-09-04

## 2024-09-04 VITALS
OXYGEN SATURATION: 99 % | DIASTOLIC BLOOD PRESSURE: 80 MMHG | HEART RATE: 68 BPM | WEIGHT: 135 LBS | RESPIRATION RATE: 14 BRPM | HEIGHT: 65 IN | BODY MASS INDEX: 22.49 KG/M2 | SYSTOLIC BLOOD PRESSURE: 114 MMHG

## 2024-09-04 DIAGNOSIS — Z00.00 ENCOUNTER FOR GENERAL ADULT MEDICAL EXAMINATION W/OUT ABNORMAL FINDINGS: ICD-10-CM

## 2024-09-04 DIAGNOSIS — Z85.828 PERSONAL HISTORY OF OTHER MALIGNANT NEOPLASM OF SKIN: ICD-10-CM

## 2024-09-04 PROCEDURE — 99396 PREV VISIT EST AGE 40-64: CPT

## 2024-09-04 NOTE — HISTORY OF PRESENT ILLNESS
[FreeTextEntry1] : Patient presents for annual physical exam. [de-identified] : VANESSA SANTACRUZ is a 58 year female who presents today Sep 04, 2024 for physical. Feels well today. No complaints.

## 2024-09-04 NOTE — HEALTH RISK ASSESSMENT
[de-identified] : neuro, GYN, Endocrinology, [de-identified] : active [de-identified] : regular  [[id number]] : 0 [Change in mental status noted] : No change in mental status noted [Language] : denies difficulty with language [Behavior] : denies difficulty with behavior [Learning/Retaining New Information] : denies difficulty learning/retaining new information [Handling Complex Tasks] : denies difficulty handling complex tasks [Reasoning] : denies difficulty with reasoning [Spatial Ability and Orientation] : denies difficulty with spatial ability and orientation [High Risk Behavior] : no high risk behavior [Reports changes in hearing] : Reports no changes in hearing [Reports changes in vision] : Reports no changes in vision [Reports changes in dental health] : Reports no changes in dental health [MammogramDate] : 12/23 [PapSmearDate] : 09/24 [ColonoscopyDate] : 07/23

## 2024-09-04 NOTE — COUNSELING
[de-identified] : -  Continue well balanced meals, daily exercise, and stress reduction techniques for overall health and wellness.

## 2024-10-07 ENCOUNTER — OFFICE (OUTPATIENT)
Dept: URBAN - METROPOLITAN AREA CLINIC 100 | Facility: CLINIC | Age: 59
Setting detail: OPHTHALMOLOGY
End: 2024-10-07
Payer: COMMERCIAL

## 2024-10-07 DIAGNOSIS — H43.393: ICD-10-CM

## 2024-10-07 DIAGNOSIS — H25.13: ICD-10-CM

## 2024-10-07 DIAGNOSIS — Z79.899: ICD-10-CM

## 2024-10-07 DIAGNOSIS — M35.9: ICD-10-CM

## 2024-10-07 PROCEDURE — 92014 COMPRE OPH EXAM EST PT 1/>: CPT | Performed by: OPHTHALMOLOGY

## 2024-10-07 PROCEDURE — 92250 FUNDUS PHOTOGRAPHY W/I&R: CPT | Performed by: OPHTHALMOLOGY

## 2024-10-07 ASSESSMENT — CONFRONTATIONAL VISUAL FIELD TEST (CVF)
OD_FINDINGS: FULL
OS_FINDINGS: FULL

## 2024-10-07 ASSESSMENT — REFRACTION_CURRENTRX
OS_VPRISM_DIRECTION: SV
OD_OVR_VA: 20/
OD_CYLINDER: -1.50
OD_AXIS: 170
OS_CYLINDER: -2.25
OS_AXIS: 175
OS_VPRISM_DIRECTION: SV
OS_CYLINDER: -2.00
OD_SPHERE: +1.00
OS_SPHERE: +0.75
OS_OVR_VA: 20/
OS_SPHERE: +0.75
OS_OVR_VA: 20/
OD_VPRISM_DIRECTION: SV
OD_SPHERE: +1.00
OS_AXIS: 175
OD_OVR_VA: 20/
OD_AXIS: 164
OD_CYLINDER: -1.50
OD_VPRISM_DIRECTION: SV

## 2024-10-07 ASSESSMENT — REFRACTION_MANIFEST
OS_AXIS: 179
OD_AXIS: 165
OS_AXIS: 005
OS_SPHERE: +0.25
OD_VA1: 20/25-2
OD_VA1: 20/25-2
OS_SPHERE: +0.25
OD_CYLINDER: -1.25
OD_AXIS: 170
OS_CYLINDER: -2.00
OD_CYLINDER: -0.50
OD_CYLINDER: -1.00
OD_AXIS: 175
OD_ADD: +2.25
OS_SPHERE: +0.25
OS_VA1: 20/30-2
OS_CYLINDER: -1.00
OS_CYLINDER: -1.75
OS_ADD: +2.25
OD_SPHERE: +0.50
OS_AXIS: 180
OS_VA1: 20/40+2
OD_VA1: 20/30-1
OD_SPHERE: +0.75
OD_SPHERE: +0.75
OS_VA1: 20/30+2

## 2024-10-07 ASSESSMENT — REFRACTION_AUTOREFRACTION
OD_AXIS: 170
OS_AXIS: 179
OS_SPHERE: +0.25
OD_SPHERE: +0.75
OS_CYLINDER: -1.75
OD_CYLINDER: -1.25

## 2024-10-07 ASSESSMENT — KERATOMETRY
OD_AXISANGLE_DEGREES: 084
METHOD_AUTO_MANUAL: AUTO
OD_K2POWER_DIOPTERS: 41.25
OS_K1POWER_DIOPTERS: 40.50
OS_K2POWER_DIOPTERS: 42.25
OS_AXISANGLE_DEGREES: 084
OD_K1POWER_DIOPTERS: 40.50

## 2024-10-07 ASSESSMENT — VISUAL ACUITY
OS_BCVA: 20/30-2
OD_BCVA: 20/30

## 2024-10-07 ASSESSMENT — TONOMETRY
OD_IOP_MMHG: 11
OS_IOP_MMHG: 10

## 2024-11-20 ENCOUNTER — APPOINTMENT (OUTPATIENT)
Dept: NEUROLOGY | Facility: CLINIC | Age: 59
End: 2024-11-20

## 2024-12-02 ENCOUNTER — APPOINTMENT (OUTPATIENT)
Dept: NEUROLOGY | Facility: CLINIC | Age: 59
End: 2024-12-02
Payer: COMMERCIAL

## 2024-12-02 VITALS
SYSTOLIC BLOOD PRESSURE: 165 MMHG | BODY MASS INDEX: 22.33 KG/M2 | WEIGHT: 134 LBS | HEART RATE: 96 BPM | HEIGHT: 65 IN | DIASTOLIC BLOOD PRESSURE: 109 MMHG | OXYGEN SATURATION: 98 %

## 2024-12-02 DIAGNOSIS — R29.898 OTHER SYMPTOMS AND SIGNS INVOLVING THE MUSCULOSKELETAL SYSTEM: ICD-10-CM

## 2024-12-02 DIAGNOSIS — G43.909 MIGRAINE, UNSPECIFIED, NOT INTRACTABLE, W/OUT STATUS MIGRAINOSUS: ICD-10-CM

## 2024-12-02 PROCEDURE — 99214 OFFICE O/P EST MOD 30 MIN: CPT

## 2024-12-02 PROCEDURE — G2211 COMPLEX E/M VISIT ADD ON: CPT | Mod: NC

## 2024-12-04 LAB
LDLC SERPL DIRECT ASSAY-MCNC: 120
TSH SERPL-ACNC: 1.36

## 2024-12-05 ENCOUNTER — APPOINTMENT (OUTPATIENT)
Dept: ENDOCRINOLOGY | Facility: CLINIC | Age: 59
End: 2024-12-05
Payer: COMMERCIAL

## 2024-12-05 VITALS
SYSTOLIC BLOOD PRESSURE: 130 MMHG | HEIGHT: 65 IN | HEART RATE: 92 BPM | WEIGHT: 135 LBS | OXYGEN SATURATION: 99 % | DIASTOLIC BLOOD PRESSURE: 80 MMHG | BODY MASS INDEX: 22.49 KG/M2

## 2024-12-05 DIAGNOSIS — E78.5 HYPERLIPIDEMIA, UNSPECIFIED: ICD-10-CM

## 2024-12-05 DIAGNOSIS — E03.9 HYPOTHYROIDISM, UNSPECIFIED: ICD-10-CM

## 2024-12-05 DIAGNOSIS — E06.3 AUTOIMMUNE THYROIDITIS: ICD-10-CM

## 2024-12-05 DIAGNOSIS — E55.9 VITAMIN D DEFICIENCY, UNSPECIFIED: ICD-10-CM

## 2024-12-05 LAB — TSH SERPL-ACNC: 2.48

## 2024-12-05 PROCEDURE — 99215 OFFICE O/P EST HI 40 MIN: CPT

## 2024-12-17 ENCOUNTER — NON-APPOINTMENT (OUTPATIENT)
Age: 59
End: 2024-12-17

## 2024-12-26 ENCOUNTER — APPOINTMENT (OUTPATIENT)
Dept: OPHTHALMOLOGY | Facility: CLINIC | Age: 59
End: 2024-12-26
Payer: COMMERCIAL

## 2024-12-26 ENCOUNTER — NON-APPOINTMENT (OUTPATIENT)
Age: 59
End: 2024-12-26

## 2024-12-26 PROCEDURE — 92133 CPTRZD OPH DX IMG PST SGM ON: CPT

## 2024-12-26 PROCEDURE — 92004 COMPRE OPH EXAM NEW PT 1/>: CPT

## 2025-02-03 ENCOUNTER — APPOINTMENT (OUTPATIENT)
Dept: NEUROLOGY | Facility: CLINIC | Age: 60
End: 2025-02-03
Payer: COMMERCIAL

## 2025-02-03 VITALS
DIASTOLIC BLOOD PRESSURE: 85 MMHG | WEIGHT: 135 LBS | HEART RATE: 85 BPM | HEIGHT: 65 IN | SYSTOLIC BLOOD PRESSURE: 122 MMHG | BODY MASS INDEX: 22.49 KG/M2 | OXYGEN SATURATION: 100 %

## 2025-02-03 DIAGNOSIS — G43.909 MIGRAINE, UNSPECIFIED, NOT INTRACTABLE, W/OUT STATUS MIGRAINOSUS: ICD-10-CM

## 2025-02-03 PROCEDURE — G2211 COMPLEX E/M VISIT ADD ON: CPT | Mod: NC

## 2025-02-03 PROCEDURE — 99213 OFFICE O/P EST LOW 20 MIN: CPT

## 2025-02-13 NOTE — H&P PST ADULT - TOBACCO USE
[FreeTextEntry1] : Physical [de-identified] : Patient comes for an annual exam.  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 He is on waiting list for psychiatrist at [facility]. He prior took Adderall ER 20 mg few yrs ago for ADHD. He would like to restart medication. Former smoker

## 2025-02-15 ENCOUNTER — APPOINTMENT (OUTPATIENT)
Dept: FAMILY MEDICINE | Facility: CLINIC | Age: 60
End: 2025-02-15
Payer: COMMERCIAL

## 2025-02-15 VITALS
OXYGEN SATURATION: 98 % | TEMPERATURE: 97.7 F | BODY MASS INDEX: 23.68 KG/M2 | SYSTOLIC BLOOD PRESSURE: 140 MMHG | HEIGHT: 65 IN | WEIGHT: 142.13 LBS | DIASTOLIC BLOOD PRESSURE: 90 MMHG | HEART RATE: 93 BPM

## 2025-02-15 DIAGNOSIS — R09.82 POSTNASAL DRIP: ICD-10-CM

## 2025-02-15 DIAGNOSIS — B34.9 VIRAL INFECTION, UNSPECIFIED: ICD-10-CM

## 2025-02-15 LAB
FLUAV SPEC QL CULT: NEGATIVE
FLUBV AG SPEC QL IA: NEGATIVE
S PYO AG SPEC QL IA: NEGATIVE
SARS-COV-2 AG RESP QL IA.RAPID: NEGATIVE

## 2025-02-15 PROCEDURE — G2211 COMPLEX E/M VISIT ADD ON: CPT | Mod: NC

## 2025-02-15 PROCEDURE — 87804 INFLUENZA ASSAY W/OPTIC: CPT | Mod: QW

## 2025-02-15 PROCEDURE — 87811 SARS-COV-2 COVID19 W/OPTIC: CPT | Mod: QW

## 2025-02-15 PROCEDURE — 87880 STREP A ASSAY W/OPTIC: CPT | Mod: QW

## 2025-02-15 PROCEDURE — 99213 OFFICE O/P EST LOW 20 MIN: CPT

## 2025-02-18 LAB
INFLUENZA A RESULT: NOT DETECTED
INFLUENZA B RESULT: NOT DETECTED
RESP SYN VIRUS RESULT: NOT DETECTED
S PYO DNA THROAT QL NAA+PROBE: NOT DETECTED
SARS-COV-2 RESULT: NOT DETECTED

## 2025-02-18 RX ORDER — AZELASTINE HYDROCHLORIDE 137 UG/1
0.1 SPRAY, METERED NASAL DAILY
Qty: 1 | Refills: 5 | Status: ACTIVE | COMMUNITY
Start: 2025-02-15 | End: 1900-01-01

## 2025-03-04 ENCOUNTER — APPOINTMENT (OUTPATIENT)
Dept: FAMILY MEDICINE | Facility: CLINIC | Age: 60
End: 2025-03-04
Payer: COMMERCIAL

## 2025-03-04 VITALS
RESPIRATION RATE: 14 BRPM | DIASTOLIC BLOOD PRESSURE: 86 MMHG | BODY MASS INDEX: 23.82 KG/M2 | OXYGEN SATURATION: 98 % | SYSTOLIC BLOOD PRESSURE: 142 MMHG | HEIGHT: 65 IN | HEART RATE: 92 BPM | WEIGHT: 143 LBS

## 2025-03-04 DIAGNOSIS — I10 ESSENTIAL (PRIMARY) HYPERTENSION: ICD-10-CM

## 2025-03-04 DIAGNOSIS — R39.9 UNSPECIFIED SYMPTOMS AND SIGNS INVOLVING THE GENITOURINARY SYSTEM: ICD-10-CM

## 2025-03-04 LAB
BILIRUB UR QL STRIP: NEGATIVE
CLARITY UR: CLEAR
COLLECTION METHOD: NORMAL
GLUCOSE UR-MCNC: NEGATIVE
HCG UR QL: 0.2 EU/DL
HGB UR QL STRIP.AUTO: NEGATIVE
KETONES UR-MCNC: NEGATIVE
LEUKOCYTE ESTERASE UR QL STRIP: NORMAL
NITRITE UR QL STRIP: NEGATIVE
PH UR STRIP: 6
PROT UR STRIP-MCNC: NEGATIVE
SP GR UR STRIP: 1

## 2025-03-04 PROCEDURE — G2211 COMPLEX E/M VISIT ADD ON: CPT | Mod: NC

## 2025-03-04 PROCEDURE — 99214 OFFICE O/P EST MOD 30 MIN: CPT

## 2025-03-04 PROCEDURE — 81003 URINALYSIS AUTO W/O SCOPE: CPT | Mod: QW

## 2025-03-05 RX ORDER — NITROFURANTOIN MACROCRYSTALS 100 MG/1
100 CAPSULE ORAL
Qty: 14 | Refills: 0 | Status: ACTIVE | COMMUNITY
Start: 2025-03-05 | End: 1900-01-01

## 2025-03-06 LAB
APPEARANCE: CLEAR
BACTERIA UR CULT: NORMAL
BACTERIA: NEGATIVE /HPF
BILIRUBIN URINE: NEGATIVE
BLOOD URINE: NEGATIVE
CAST: 0 /LPF
COLOR: YELLOW
EPITHELIAL CELLS: 5 /HPF
GLUCOSE QUALITATIVE U: NEGATIVE MG/DL
KETONES URINE: NEGATIVE MG/DL
LEUKOCYTE ESTERASE URINE: ABNORMAL
MICROSCOPIC-UA: NORMAL
NITRITE URINE: NEGATIVE
PH URINE: 6.5
PROTEIN URINE: NEGATIVE MG/DL
RED BLOOD CELLS URINE: 0 /HPF
SPECIFIC GRAVITY URINE: 1.01
UROBILINOGEN URINE: 0.2 MG/DL
WHITE BLOOD CELLS URINE: 27 /HPF

## 2025-03-19 ENCOUNTER — RX RENEWAL (OUTPATIENT)
Age: 60
End: 2025-03-19

## 2025-04-02 PROBLEM — F43.20 GRIEF REACTION: Status: ACTIVE | Noted: 2022-01-08

## 2025-04-22 ENCOUNTER — RX RENEWAL (OUTPATIENT)
Age: 60
End: 2025-04-22

## 2025-05-19 ENCOUNTER — APPOINTMENT (OUTPATIENT)
Dept: FAMILY MEDICINE | Facility: CLINIC | Age: 60
End: 2025-05-19
Payer: COMMERCIAL

## 2025-05-19 VITALS
BODY MASS INDEX: 24.16 KG/M2 | DIASTOLIC BLOOD PRESSURE: 80 MMHG | WEIGHT: 145 LBS | SYSTOLIC BLOOD PRESSURE: 140 MMHG | OXYGEN SATURATION: 98 % | RESPIRATION RATE: 14 BRPM | HEART RATE: 96 BPM | HEIGHT: 65 IN
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
## 2025-05-19 DIAGNOSIS — I10 ESSENTIAL (PRIMARY) HYPERTENSION: ICD-10-CM

## 2025-05-19 DIAGNOSIS — M35.9 SYSTEMIC INVOLVEMENT OF CONNECTIVE TISSUE, UNSPECIFIED: ICD-10-CM

## 2025-05-19 DIAGNOSIS — S40.861A INSECT BITE (NONVENOMOUS) OF RIGHT UPPER ARM, INITIAL ENCOUNTER: ICD-10-CM

## 2025-05-19 PROCEDURE — G2211 COMPLEX E/M VISIT ADD ON: CPT | Mod: NC

## 2025-05-19 PROCEDURE — 99214 OFFICE O/P EST MOD 30 MIN: CPT

## 2025-05-19 RX ORDER — DOXYCYCLINE 100 MG/1
100 TABLET, FILM COATED ORAL
Qty: 14 | Refills: 0 | Status: ACTIVE | COMMUNITY
Start: 2025-05-19 | End: 1900-01-01

## 2025-05-30 ENCOUNTER — RX RENEWAL (OUTPATIENT)
Age: 60
End: 2025-05-30

## 2025-06-17 ENCOUNTER — NON-APPOINTMENT (OUTPATIENT)
Age: 60
End: 2025-06-17

## 2025-07-21 ENCOUNTER — OFFICE (OUTPATIENT)
Dept: URBAN - METROPOLITAN AREA CLINIC 100 | Facility: CLINIC | Age: 60
Setting detail: OPHTHALMOLOGY
End: 2025-07-21
Payer: COMMERCIAL

## 2025-07-21 DIAGNOSIS — M35.9: ICD-10-CM

## 2025-07-21 DIAGNOSIS — Z79.899: ICD-10-CM

## 2025-07-21 DIAGNOSIS — H43.393: ICD-10-CM

## 2025-07-21 DIAGNOSIS — H25.13: ICD-10-CM

## 2025-07-21 PROCEDURE — 92014 COMPRE OPH EXAM EST PT 1/>: CPT | Performed by: OPHTHALMOLOGY

## 2025-07-21 PROCEDURE — 92083 EXTENDED VISUAL FIELD XM: CPT | Performed by: OPHTHALMOLOGY

## 2025-07-21 ASSESSMENT — VISUAL ACUITY
OD_BCVA: 20/30
OS_BCVA: 20/30-2

## 2025-07-21 ASSESSMENT — REFRACTION_CURRENTRX
OD_CYLINDER: -1.50
OD_AXIS: 164
OD_VPRISM_DIRECTION: SV
OD_AXIS: 170
OD_OVR_VA: 20/
OS_OVR_VA: 20/
OS_VPRISM_DIRECTION: SV
OS_CYLINDER: -2.00
OS_OVR_VA: 20/
OS_VPRISM_DIRECTION: SV
OD_OVR_VA: 20/
OS_AXIS: 175
OS_AXIS: 175
OD_VPRISM_DIRECTION: SV
OD_SPHERE: +1.00
OS_CYLINDER: -2.25
OD_CYLINDER: -1.50
OD_SPHERE: +1.00
OS_SPHERE: +0.75
OS_SPHERE: +0.75

## 2025-07-21 ASSESSMENT — REFRACTION_MANIFEST
OD_AXIS: 165
OD_SPHERE: +0.75
OS_CYLINDER: -1.75
OD_ADD: +2.25
OS_AXIS: 179
OS_VA1: 20/30-2
OS_CYLINDER: -1.00
OD_AXIS: 170
OS_CYLINDER: -2.00
OS_SPHERE: +0.25
OS_AXIS: 005
OS_SPHERE: +0.25
OD_VA1: 20/30-1
OS_SPHERE: +0.25
OS_VA1: 20/30+2
OD_CYLINDER: -1.25
OD_VA1: 20/25-2
OS_AXIS: 180
OD_SPHERE: +0.75
OD_SPHERE: +0.50
OD_CYLINDER: -0.50
OD_VA1: 20/25-2
OS_ADD: +2.25
OD_CYLINDER: -1.00
OD_AXIS: 175
OS_VA1: 20/40+2

## 2025-07-21 ASSESSMENT — CONFRONTATIONAL VISUAL FIELD TEST (CVF)
OD_FINDINGS: FULL
OS_FINDINGS: FULL

## 2025-07-21 ASSESSMENT — TONOMETRY
OD_IOP_MMHG: 12
OS_IOP_MMHG: 13

## 2025-07-21 ASSESSMENT — KERATOMETRY
OD_AXISANGLE_DEGREES: 077
OS_AXISANGLE_DEGREES: 084
OD_K1POWER_DIOPTERS: 40.25
METHOD_AUTO_MANUAL: AUTO
OS_K1POWER_DIOPTERS: 40.25
OD_K2POWER_DIOPTERS: 41.00
OS_K2POWER_DIOPTERS: 42.00

## 2025-07-21 ASSESSMENT — REFRACTION_AUTOREFRACTION
OD_SPHERE: +1.00
OD_AXIS: 175
OS_SPHERE: -0.25
OS_CYLINDER: -1.75
OD_CYLINDER: -1.25
OS_AXIS: 179

## 2025-08-08 ENCOUNTER — APPOINTMENT (OUTPATIENT)
Dept: NEUROLOGY | Facility: CLINIC | Age: 60
End: 2025-08-08

## 2025-08-28 ENCOUNTER — NON-APPOINTMENT (OUTPATIENT)
Age: 60
End: 2025-08-28

## 2025-09-02 ENCOUNTER — APPOINTMENT (OUTPATIENT)
Dept: FAMILY MEDICINE | Facility: CLINIC | Age: 60
End: 2025-09-02

## (undated) DEVICE — DRAPE FLUID WARMER 44 X 44"

## (undated) DEVICE — DRAPE 3/4 SHEET 52X76"

## (undated) DEVICE — PACK HEAD & NECK

## (undated) DEVICE — DRAPE MAGNETIC INSTRUMENT MEDIUM

## (undated) DEVICE — SYR ASEPTO

## (undated) DEVICE — SUT MONOCRYL 4-0 27" PS-2 UNDYED

## (undated) DEVICE — BIPOLAR FORCEP KIRWAN JEWELERS STR 4" X 0.4MM W 12FT CORD (GREEN)

## (undated) DEVICE — SUT ETHILON 3-0 18" FS-1

## (undated) DEVICE — DRSG BENZOIN 0.6CC

## (undated) DEVICE — LAP PAD W RING 18 X 18"

## (undated) DEVICE — VENODYNE/SCD SLEEVE CALF MEDIUM

## (undated) DEVICE — SUT VICRYL 2-0 18" TIES UNDYED

## (undated) DEVICE — SUT VICRYL 0 18" TIES UNDYED

## (undated) DEVICE — LABELS BLANK W PEN

## (undated) DEVICE — DRAIN JACKSON PRATT 7MM FLAT FULL NO TROCAR

## (undated) DEVICE — ELCTR GROUNDING PAD ADULT COVIDIEN

## (undated) DEVICE — DRSG XEROFORM 5 X 9"

## (undated) DEVICE — CANISTER DISPOSABLE THIN WALL 3000CC

## (undated) DEVICE — VISITEC 4X4

## (undated) DEVICE — SOL IRR BAG H2O 2000ML

## (undated) DEVICE — GLV 6.5 PROTEXIS W HYDROGEL

## (undated) DEVICE — SUT VICRYL 3-0 18" TIES UNDYED

## (undated) DEVICE — SUT CHROMIC 3-0 27" SH

## (undated) DEVICE — PROTECTOR HEEL / ELBOW FLUFFY

## (undated) DEVICE — DRAPE TOWEL BLUE 17" X 24"

## (undated) DEVICE — DRAPE LAPAROTOMY TRANSVERSE

## (undated) DEVICE — POSITIONER FOAM EGG CRATE ULNAR 2PCS (PINK)